# Patient Record
Sex: FEMALE | Race: WHITE | NOT HISPANIC OR LATINO | Employment: UNEMPLOYED | URBAN - METROPOLITAN AREA
[De-identification: names, ages, dates, MRNs, and addresses within clinical notes are randomized per-mention and may not be internally consistent; named-entity substitution may affect disease eponyms.]

---

## 2017-01-03 ENCOUNTER — MYC REFILL (OUTPATIENT)
Dept: PEDIATRICS | Facility: CLINIC | Age: 42
End: 2017-01-03

## 2017-01-03 DIAGNOSIS — F32.A DEPRESSION, UNSPECIFIED DEPRESSION TYPE: ICD-10-CM

## 2017-01-03 RX ORDER — BUPROPION HYDROCHLORIDE 150 MG/1
TABLET, EXTENDED RELEASE ORAL
Qty: 180 TABLET | Refills: 0 | Status: SHIPPED | OUTPATIENT
Start: 2017-01-03 | End: 2017-04-08

## 2017-01-03 NOTE — TELEPHONE ENCOUNTER
Wellbutrin  MG       Last Written Prescription Date: 9-29-16  Last Fill Quantity: 60; # refills: 2  Last Office Visit with [facility], [facility] or [facility] prescribing provider:  9/29/16        Last PHQ-9 score on record=   PHQ-9 SCORE 9/28/2016   Total Score MyChart 13 (Moderate depression)       No results found for this basename: ast  No results found for this basename: alt    Routing refill request to provider for review/approval because:  Patient needs to be seen because:  Patient was to follow up in 2 wks and has not.

## 2017-01-03 NOTE — TELEPHONE ENCOUNTER
Message from Kathleent:  Original authorizing provider: NORI Hines CNP would like a refill of the following medications:  buPROPion (WELLBUTRIN SR) 150 MG 12 hr tablet [NORI Hines CNP]    Preferred pharmacy: [id number] - [place], [place] - [address] [address]    Comment:

## 2017-01-05 ENCOUNTER — OFFICE VISIT (OUTPATIENT)
Dept: PEDIATRICS | Facility: CLINIC | Age: 42
End: 2017-01-05
Payer: COMMERCIAL

## 2017-01-05 VITALS
BODY MASS INDEX: 42.24 KG/M2 | WEIGHT: 253.5 LBS | HEIGHT: 65 IN | DIASTOLIC BLOOD PRESSURE: 74 MMHG | OXYGEN SATURATION: 97 % | SYSTOLIC BLOOD PRESSURE: 120 MMHG | TEMPERATURE: 98.2 F | HEART RATE: 108 BPM

## 2017-01-05 DIAGNOSIS — F32.A DEPRESSION, UNSPECIFIED DEPRESSION TYPE: Primary | ICD-10-CM

## 2017-01-05 PROCEDURE — 99213 OFFICE O/P EST LOW 20 MIN: CPT | Performed by: NURSE PRACTITIONER

## 2017-01-05 RX ORDER — BUPROPION HYDROCHLORIDE 150 MG/1
TABLET, EXTENDED RELEASE ORAL
Qty: 180 TABLET | Refills: 0 | Status: CANCELLED | OUTPATIENT
Start: 2017-01-05

## 2017-01-05 NOTE — PATIENT INSTRUCTIONS
1. Continue Wellbutrin  2. Come up with a twice a week exercise regimen (25 minutes minimum)  3. Continue coloring.   4. Come up with a phrase to help remind you that working out will benefit your family.

## 2017-01-05 NOTE — PROGRESS NOTES
"  SUBJECTIVE:                                                    Elle Lambert is a 41 year old female who presents to clinic today for the following health issues:    Medication Followup of Wellbutrin    Taking Medication as prescribed: yes    Side Effects:  None    Medication Helping Symptoms:  yes     Much improved since starting Wellbutrin. Feels she has more energy with her son and  and is more engaged. Is less edgy. Feels she is bonding better with her son and can see changes in him as well. They are focusing now on eating home cooked meals. Wants to start exercising.    ROS: const/psych otherwise negative     OBJECTIVE:  /74 mmHg  Pulse 108  Temp(Src) 98.2  F (36.8  C) (Tympanic)  Ht 5' 5.25\" (1.657 m)  Wt 253 lb 8 oz (114.987 kg)  BMI 41.88 kg/m2  SpO2 97%  CONSTITUTIONAL: Alert, well-nourished, well-groomed, NAD  RESP: Lungs CTA. No wheeze, rhonchi, rales.  CV: HRRR S1 S2 No MRG. No peripheral edema  PSYCH: Bright affect. Appropriate mentation and speech.       ASSESSMENT/PLAN:  (F32.9) Depression, unspecified depression type  (primary encounter diagnosis)  Comment: Much improved since starting Wellbutrin 3 months ago. Has noticed more patience and energy with her 2 y.o. Son and husbnad.   Plan:   Patient Instructions   1. Continue Wellbutrin  2. Come up with a twice a week exercise regimen (25 minutes minimum)  3. Continue coloring.   4. Come up with a phrase to help remind you that working out will benefit your family as well as yourself  5. F/U 3 months  6. \"The Chemistry of [place]na\" by Adiel Ojeda.       Gaby Bui, FNP-DNP.        "

## 2017-01-05 NOTE — MR AVS SNAPSHOT
After Visit Summary   1/5/2017    Elle Lambert    MRN: [id number]           Patient Information     Date Of Birth          1975        Visit Information        Provider Department      1/5/2017 8:00 AM Gaby Bui APRN CNP [facility]        Today's Diagnoses     Depression, unspecified depression type    -  1       Care Instructions    1. Continue Wellbutrin  2. Come up with a twice a week exercise regimen (25 minutes minimum)  3. Continue coloring.   4. Come up with a phrase to help remind you that working out will benefit your family.         Follow-ups after your visit        Who to contact     If you have questions or need follow up information about today's clinic visit or your schedule please contact [facility] directly at [phone].  Normal or non-critical lab and imaging results will be communicated to you by Haltonhart, letter or phone within 4 business days after the clinic has received the results. If you do not hear from us within 7 days, please contact the clinic through Haltonhart or phone. If you have a critical or abnormal lab result, we will notify you by phone as soon as possible.  Submit refill requests through Quantum4D or call your pharmacy and they will forward the refill request to us. Please allow 3 business days for your refill to be completed.          Additional Information About Your Visit        MyChart Information     Quantum4D gives you secure access to your electronic health record. If you see a primary care provider, you can also send messages to your care team and make appointments. If you have questions, please call your primary care clinic.  If you do not have a primary care provider, please call [phone] and they will assist you.        Care EveryWhere ID     This is your Care EveryWhere ID. This could be used by other organizations to access your [place] medical records  [id number]        Your Vitals Were     Pulse  "Temperature Height BMI (Body Mass Index) Pulse Oximetry       108 98.2  F (36.8  C) (Tympanic) 5' 5.25\" (1.657 m) 41.88 kg/m2 97%        Blood Pressure from Last 3 Encounters:   01/05/17 120/74   09/29/16 116/74   11/27/14 135/60    Weight from Last 3 Encounters:   01/05/17 253 lb 8 oz (114.987 kg)   09/29/16 250 lb (113.399 kg)   11/25/14 232 lb (105.235 kg)              Today, you had the following     No orders found for display       Primary Care Provider    [facility]       No address on file        Thank you!     Thank you for choosing [facility]  for your care. Our goal is always to provide you with excellent care. Hearing back from our patients is one way we can continue to improve our services. Please take a few minutes to complete the written survey that you may receive in the mail after your visit with us. Thank you!             Your Updated Medication List - Protect others around you: Learn how to safely use, store and throw away your medicines at www.disposemymeds.org.          This list is accurate as of: 1/5/17  8:30 AM.  Always use your most recent med list.                   Brand Name Dispense Instructions for use    5-HTP PO          acetaminophen 325 MG tablet    TYLENOL    100 tablet    Take 2 tablets (650 mg) by mouth every 4 hours as needed for mild pain or fever (greater than or equal to 38? C /100.4? F (oral) or 38.5? C/ 101.4? F (core).)       buPROPion 150 MG 12 hr tablet    WELLBUTRIN SR    180 tablet    1 tab twice a day       ferrous sulfate 325 (65 FE) MG tablet    IRON    90 tablet    Take 1 tablet (325 mg) by mouth daily (with breakfast)       loratadine 10 MG ODT tab    CLARITIN REDITABS     Take 10 mg by mouth daily       Multi-vitamin Tabs tablet      Take 1 tablet by mouth daily       OMEGA-3 FISH OIL PO          OMEPRAZOLE PO      Take 20 mg by mouth       vitamin D 2000 UNITS tablet     100 tablet    Take 1 tablet by mouth 3 times daily         "

## 2017-01-05 NOTE — NURSING NOTE
"Chief Complaint   Patient presents with     Recheck Medication     wellbutrin       Initial /74 mmHg  Pulse 108  Temp(Src) 98.2  F (36.8  C) (Tympanic)  Ht 5' 5.25\" (1.657 m)  Wt 253 lb 8 oz (114.987 kg)  BMI 41.88 kg/m2  SpO2 97% Estimated body mass index is 41.88 kg/(m^2) as calculated from the following:    Height as of this encounter: 5' 5.25\" (1.657 m).    Weight as of this encounter: 253 lb 8 oz (114.987 kg).  BP completed using cuff size: janes Mata CMA      "

## 2017-04-08 DIAGNOSIS — F32.A DEPRESSION, UNSPECIFIED DEPRESSION TYPE: ICD-10-CM

## 2017-04-09 NOTE — TELEPHONE ENCOUNTER
buPROPion (WELLBUTRIN SR) 150 MG 12 hr tablet       Last Written Prescription Date: 01-  Last Fill Quantity: 180; # refills: 0  Last Office Visit with [facility], [facility] or [facility] prescribing provider:  01-        Last PHQ-9 score on record=   PHQ-9 SCORE 9/28/2016   Total Score MyChart 13 (Moderate depression)       No results found for: AST  No results found for: ALT

## 2017-04-10 ENCOUNTER — MYC MEDICAL ADVICE (OUTPATIENT)
Dept: PEDIATRICS | Facility: CLINIC | Age: 42
End: 2017-04-10

## 2017-04-10 NOTE — TELEPHONE ENCOUNTER
Routing refill request to provider for review/approval because:  Lab results are not current.  PHQ score is higher than RN protocol.  I have sent patient a questionnaire to complete.    Please see My Chart message-patient has been out of medication for a few days, and would also like a dose change.  Appointment advised.  Order pended for one refill?  CASEY Bartlett RN

## 2017-04-11 ENCOUNTER — MYC MEDICAL ADVICE (OUTPATIENT)
Dept: PEDIATRICS | Facility: CLINIC | Age: 42
End: 2017-04-11

## 2017-04-11 RX ORDER — BUPROPION HYDROCHLORIDE 150 MG/1
TABLET, EXTENDED RELEASE ORAL
Qty: 60 TABLET | Refills: 0 | Status: SHIPPED | OUTPATIENT
Start: 2017-04-11 | End: 2017-05-11

## 2017-04-11 NOTE — TELEPHONE ENCOUNTER
Forwarded to provider for review.  Ok to refill for one month pending an appointment to discuss dose adjustment.  CASEY Bartlett RN

## 2017-04-13 ENCOUNTER — OFFICE VISIT (OUTPATIENT)
Dept: PEDIATRICS | Facility: CLINIC | Age: 42
End: 2017-04-13
Payer: COMMERCIAL

## 2017-04-13 VITALS
OXYGEN SATURATION: 98 % | HEART RATE: 93 BPM | BODY MASS INDEX: 42.05 KG/M2 | HEIGHT: 65 IN | SYSTOLIC BLOOD PRESSURE: 114 MMHG | WEIGHT: 252.4 LBS | DIASTOLIC BLOOD PRESSURE: 74 MMHG | TEMPERATURE: 97.9 F

## 2017-04-13 DIAGNOSIS — F43.23 ADJUSTMENT DISORDER WITH MIXED ANXIETY AND DEPRESSED MOOD: Primary | ICD-10-CM

## 2017-04-13 PROCEDURE — 99214 OFFICE O/P EST MOD 30 MIN: CPT | Performed by: NURSE PRACTITIONER

## 2017-04-13 RX ORDER — ESCITALOPRAM OXALATE 10 MG/1
10 TABLET ORAL DAILY
Qty: 90 TABLET | Refills: 0 | Status: SHIPPED | OUTPATIENT
Start: 2017-04-13 | End: 2017-07-13

## 2017-04-13 NOTE — PROGRESS NOTES
"  SUBJECTIVE:                                                    Elle Lambert is a 42 year old female who presents to clinic today for the following health issues:    Medication Followup of Wellbutrin    Taking Medication as prescribed: yes    Side Effects:  None    Medication Helping Symptoms:  yes     Reports that since beginning the medication, she has experienced an improvement in her symptoms. However, does not feel she is optimally managed. Still experiences feelings of guilt about being an inadequate mother and wife. Rates her depressive symptoms at their best as 5/10 and at their worst a 0/10. Last weekend she ran out of her Wellbutrin and states she \"hit rock bottom.\" Since beginning her medication again, symptoms have improved. Her  offers helpfpul support and she also finds support through several online mother groups. She has not had the energy to begin making lifestyle modifications including diet and exercise but does report getting a fit bit for her birthday, which she has found helpful in increasing her awareness of her daily activity level.    Problem list and histories reviewed & adjusted, as indicated.  Additional history: as documented    Patient Active Problem List   Diagnosis     Galactorrhea not associated with childbirth     Vitamin B 12 deficiency     Bariatric surgery status     Vitamin D deficiency     Morbid obesity (H)     Mild recurrent major depression (H)     Depression, unspecified depression type     Past Surgical History:   Procedure Laterality Date     GASTRIC BYPASS  2004       Social History   Substance Use Topics     Smoking status: Former Smoker     Quit date: 8/11/2013     Smokeless tobacco: Never Used     Alcohol use Yes      Comment: 2 times per month, 2 drinks per time     Family History   Problem Relation Age of Onset     CANCER Father 48     lung-smoker     Hypertension Mother      Other Cancer Maternal Grandfather      brain     Asthma Paternal Grandmother      " "Asthma Paternal Grandfather      Coronary Artery Disease Paternal Grandfather      Heart Failure Paternal Grandfather          Current Outpatient Prescriptions   Medication Sig Dispense Refill     escitalopram (LEXAPRO) 10 MG tablet Take 1 tablet (10 mg) by mouth daily 90 tablet 0     buPROPion (WELLBUTRIN SR) 150 MG 12 hr tablet 1 tab twice a day 60 tablet 0     ferrous sulfate (IRON) 325 (65 FE) MG tablet Take 1 tablet (325 mg) by mouth daily (with breakfast) 90 tablet 0     loratadine (CLARITIN REDITABS) 10 MG dispersible tablet Take 10 mg by mouth daily       OMEPRAZOLE PO Take 20 mg by mouth       Omega-3 Fatty Acids (OMEGA-3 FISH OIL PO)        multivitamin, therapeutic with minerals (MULTI-VITAMIN) TABS Take 1 tablet by mouth daily       Cholecalciferol (VITAMIN D) 2000 UNITS tablet Take 1 tablet by mouth 3 times daily  100 tablet 3     5-Hydroxytryptophan (5-HTP PO) Reported on 4/13/2017       acetaminophen (TYLENOL) 325 MG tablet Take 2 tablets (650 mg) by mouth every 4 hours as needed for mild pain or fever (greater than or equal to 38  C /100.4  F (oral) or 38.5  C/ 101.4  F (core).) (Patient not taking: Reported on 4/13/2017) 100 tablet 0     Allergies   Allergen Reactions     Cefuroxime Axetil      ceftin     Nsaids      Had gastric bypass       Reviewed and updated as needed this visit by clinical staff       Reviewed and updated as needed this visit by Provider         ROS:  Unless otherwise noted in HPI, complete ROS is negative.    OBJECTIVE:                                                    /74 (Cuff Size: Adult Large)  Pulse 93  Temp 97.9  F (36.6  C) (Tympanic)  Ht 5' 5.25\" (1.657 m)  Wt 252 lb 6.4 oz (114.5 kg)  SpO2 98%  BMI 41.68 kg/m2  Body mass index is 41.68 kg/(m^2).  GENERAL: healthy, alert and no distress  PSYCH: mentation appears normal, affect normal/bright. Does appears tearful at times.     Diagnostic Test Results:  none      ASSESSMENT/PLAN:                              " "                        (F43.23) Adjustment disorder with mixed anxiety and depressed mood  (primary encounter diagnosis)  Comment: Initiated Wellbutrin Jan/2017. Reports symptoms improvement, but is not optimally managed. Continues to experience feelings of guilt about being an inadequate mother and wife. Rates depressive symptoms between 0-5/10. Last weekend she ran out of her Wellbutrin and states she \"hit rock bottom.\" Finds support through  and several online mother groups. Has not had the energy to begin making lifestyle modifications like exercise. Denies suicidal or homicidal ideation.     I would like to add an SSRI to the current regimen as I do not believe depressive symptoms are optimally managed. Has tried Zoloft previously without effect, so will try Lexapro. I also recommend CBT at least weekly, patient is receptive to this. I am hopeful this treatment plan will create the space for patient to work on lifestyle modifications, specifically exercise as I feel this will improve her symptoms of depression.  Plan:        - MENTAL HEALTH REFERRAL       - escitalopram (LEXAPRO) 10 MG tablet         Follow-up: return to clinic or call me in 6 weeks or sooner if new symptoms develop, symptoms fail to improve or worsen.     Holly Hsieh RN, FNP-DNP Student  [facility]    NORI Hines [facility]    "

## 2017-04-13 NOTE — MR AVS SNAPSHOT
After Visit Summary   4/13/2017    Elle Lambert    MRN: [id number]           Patient Information     Date Of Birth          1975        Visit Information        Provider Department      4/13/2017 11:40 AM Gaby Bui APRN CNP [facility]        Today's Diagnoses     Adjustment disorder with mixed anxiety and depressed mood    -  1      Care Instructions    1. Set up an appointment with a therapist. I recommend at least once weekly.  2. Mima's light [email or web address]  3. Start Lexapro. Start with 1/2 tab per day for about 3 days then go to a full tab.         Follow-ups after your visit        Additional Services     MENTAL HEALTH REFERRAL       Your provider has referred you to: [facility]: [facility] - Counseling (Individual/Couples/Family) - [facility] [phone]   [email or web address]   *Patient will be contacted by [facility] scheduling partner, [facility]), to schedule an appointment.  Patients may also call [facility] to schedule.    All scheduling is subject to the client's specific insurance plan & benefits, provider/location availability, and provider clinical specialities.  Please arrive 15 minutes early for your first appointment and bring your completed paperwork.    Please be aware that coverage of these services is subject to the terms and limitations of your health insurance plan.  Call member services at your health plan with any benefit or coverage questions.                  Who to contact     If you have questions or need follow up information about today's clinic visit or your schedule please contact [facility] directly at [phone].  Normal or non-critical lab and imaging results will be communicated to you by MyChart, letter or phone within 4  "business days after the clinic has received the results. If you do not hear from us within 7 days, please contact the clinic through Practice Fusion or phone. If you have a critical or abnormal lab result, we will notify you by phone as soon as possible.  Submit refill requests through Practice Fusion or call your pharmacy and they will forward the refill request to us. Please allow 3 business days for your refill to be completed.          Additional Information About Your Visit        Practice Fusion Information     P[employer] Fusion gives you secure access to your electronic health record. If you see a primary care provider, you can also send messages to your care team and make appointments. If you have questions, please call your primary care clinic.  If you do not have a primary care provider, please call [phone] and they will assist you.        Care EveryWhere ID     This is your Care EveryWhere ID. This could be used by other organizations to access your [place] medical records  [id number]        Your Vitals Were     Pulse Temperature Height Pulse Oximetry BMI (Body Mass Index)       93 97.9  F (36.6  C) (Tympanic) 5' 5.25\" (1.657 m) 98% 41.68 kg/m2        Blood Pressure from Last 3 Encounters:   04/13/17 114/74   01/05/17 120/74   09/29/16 116/74    Weight from Last 3 Encounters:   04/13/17 252 lb 6.4 oz (114.5 kg)   01/05/17 253 lb 8 oz (115 kg)   09/29/16 250 lb (113.4 kg)              We Performed the Following     MENTAL HEALTH REFERRAL          Today's Medication Changes          These changes are accurate as of: 4/13/17 12:29 PM.  If you have any questions, ask your nurse or doctor.               Start taking these medicines.        Dose/Directions    escitalopram 10 MG tablet   Commonly known as:  LEXAPRO   Used for:  Adjustment disorder with mixed anxiety and depressed mood   Started by:  Gaby Bui APRN CNP        Dose:  10 mg   Take 1 tablet (10 mg) by mouth daily   Quantity:  90 tablet   Refills:  0          "   Where to get your medicines      These medications were sent to [id number] - [place], [place] - [address] [address]  [address] [address], [place] [address]     Phone:  [phone]     escitalopram 10 MG tablet                Primary Care Provider    [facility] [place] [facility]       No address on file        Thank you!     Thank you for choosing [facility]  for your care. Our goal is always to provide you with excellent care. Hearing back from our patients is one way we can continue to improve our services. Please take a few minutes to complete the written survey that you may receive in the mail after your visit with us. Thank you!             Your Updated Medication List - Protect others around you: Learn how to safely use, store and throw away your medicines at www.disposemymeds.org.          This list is accurate as of: 4/13/17 12:29 PM.  Always use your most recent med list.                   Brand Name Dispense Instructions for use    5-HTP PO      Reported on 4/13/2017       acetaminophen 325 MG tablet    TYLENOL    100 tablet    Take 2 tablets (650 mg) by mouth every 4 hours as needed for mild pain or fever (greater than or equal to 38? C /100.4? F (oral) or 38.5? C/ 101.4? F (core).)       buPROPion 150 MG 12 hr tablet    WELLBUTRIN SR    60 tablet    1 tab twice a day       escitalopram 10 MG tablet    LEXAPRO    90 tablet    Take 1 tablet (10 mg) by mouth daily       ferrous sulfate 325 (65 FE) MG tablet    IRON    90 tablet    Take 1 tablet (325 mg) by mouth daily (with breakfast)       loratadine 10 MG ODT tab    CLARITIN REDITABS     Take 10 mg by mouth daily       Multi-vitamin Tabs tablet      Take 1 tablet by mouth daily       OMEGA-3 FISH OIL PO          OMEPRAZOLE PO      Take 20 mg by mouth       vitamin D 2000 UNITS tablet     100 tablet    Take 1 tablet by mouth 3 times daily

## 2017-04-13 NOTE — PATIENT INSTRUCTIONS
1. Set up an appointment with a therapist. I recommend at least once weekly.  2. Mima's light [email or web address]  3. Start Lexapro. Start with 1/2 tab per day for about 3 days then go to a full tab.

## 2017-04-13 NOTE — NURSING NOTE
"Chief Complaint   Patient presents with     Depression       Initial /74 (Cuff Size: Adult Large)  Pulse 93  Temp 97.9  F (36.6  C) (Tympanic)  Ht 5' 5.25\" (1.657 m)  Wt 252 lb 6.4 oz (114.5 kg)  SpO2 98%  BMI 41.68 kg/m2 Estimated body mass index is 41.68 kg/(m^2) as calculated from the following:    Height as of this encounter: 5' 5.25\" (1.657 m).    Weight as of this encounter: 252 lb 6.4 oz (114.5 kg).  Medication Reconciliation: complete   Kimi Mata CMA    "

## 2017-04-14 ASSESSMENT — PATIENT HEALTH QUESTIONNAIRE - PHQ9: SUM OF ALL RESPONSES TO PHQ QUESTIONS 1-9: 8

## 2017-05-08 ENCOUNTER — TELEPHONE (OUTPATIENT)
Dept: PEDIATRICS | Facility: CLINIC | Age: 42
End: 2017-05-08

## 2017-05-08 DIAGNOSIS — F32.A DEPRESSION, UNSPECIFIED DEPRESSION TYPE: Primary | ICD-10-CM

## 2017-05-08 NOTE — TELEPHONE ENCOUNTER
Panel Management Review      Patient has the following on her problem list:     Depression / Dysthymia review  PHQ-9 SCORE 9/28/2016 4/13/2017   Total Score MyChart 13 (Moderate depression) -   Total Score - 8      Patient is due for:  DAP      Composite cancer screening  Chart review shows that this patient is due/due soon for the following Pap Smear  Summary:    Patient is due/failing the following:   DAP, PAP and PHYSICAL    Action needed:   Patient needs office visit for pap & physical. DAP run off and mailed to patient.    Type of outreach:    Sent [employer] message.    Questions for provider review:    None                                                                                                                                    Kimi Mata CMA    Chart routed to Care Team .

## 2017-05-08 NOTE — LETTER
My Depression Action Plan  Name: Elle Lambert   Date of Birth 1975  Date: 5/8/2017    My doctor: [facility]   [facility]  [address]  [place] [address] [phone]          GREEN    ZONE   Good Control    What it looks like:     Things are going generally well. You have normal up s and down s. You may even feel depressed from time to time, but bad moods usually last less than a day.   What you need to do:  1. Continue to care for yourself (see self care plan)  2. Check your depression survival kit and update it as needed  3. Follow your physician s recommendations including any medication.  4. Do not stop taking medication unless you consult with your physician first.           YELLOW         ZONE Getting Worse    What it looks like:     Depression is starting to interfere with your life.     It may be hard to get out of bed; you may be starting to isolate yourself from others.    Symptoms of depression are starting to last most all day and this has happened for several days.     You may have suicidal thoughts but they are not constant.   What you need to do:     1. Call your care team, your response to treatment will improve if you keep your care team informed of your progress. Yellow periods are signs an adjustment may need to be made.     2. Continue your self-care, even if you have to fake it!    3. Talk to someone in your support network    4. Open up your depression survival kit           RED    ZONE Medical Alert - Get Help    What it looks like:     Depression is seriously interfering with your life.     You may experience these or other symptoms: You can t get out of bed most days, can t work or engage in other necessary activities, you have trouble taking care of basic hygiene, or basic responsibilities, thoughts of suicide or death that will not go away, self-injurious behavior.     What you need to  do:  1. Call your care team and request a same-day appointment. If they are not available (weekends or after hours) call your local crisis line, emergency room or 911.      Electronically signed by: Kimi Mata, May 8, 2017    Depression Self Care Plan / Survival Kit    Self-Care for Depression  Here s the deal. Your body and mind are really not as separate as most people think.  What you do and think affects how you feel and how you feel influences what you do and think. This means if you do things that people who feel good do, it will help you feel better.  Sometimes this is all it takes.  There is also a place for medication and therapy depending on how severe your depression is, so be sure to consult with your medical provider and/ or Behavioral Health Consultant if your symptoms are worsening or not improving.     In order to better manage my stress, I will:    Exercise  Get some form of exercise, every day. This will help reduce pain and release endorphins, the  feel good  chemicals in your brain. This is almost as good as taking antidepressants!  This is not the same as joining a gym and then never going! (they count on that by the way ) It can be as simple as just going for a walk or doing some gardening, anything that will get you moving.      Hygiene   Maintain good hygiene (Get out of bed in the morning, Make your bed, Brush your teeth, Take a shower, and Get dressed like you were going to work, even if you are unemployed).  If your clothes don't fit try to get ones that do.    Diet  I will strive to eat foods that are good for me, drink plenty of water, and avoid excessive sugar, caffeine, alcohol, and other mood-altering substances.  Some foods that are helpful in depression are: complex carbohydrates, B vitamins, flaxseed, fish or fish oil, fresh fruits and vegetables.    Psychotherapy  I agree to participate in Individual Therapy (if recommended).    Medication  If prescribed medications, I agree to  take them.  Missing doses can result in serious side effects.  I understand that drinking alcohol, or other illicit drug use, may cause potential side effects.  I will not stop my medication abruptly without first discussing it with my provider.    Staying Connected With Others  I will stay in touch with my friends, family members, and my primary care provider/team.    Use your imagination  Be creative.  We all have a creative side; it doesn t matter if it s oil painting, sand castles, or mud pies! This will also kick up the endorphins.    Witness Beauty  (AKA stop and smell the roses) Take a look outside, even in mid-winter. Notice colors, textures. Watch the squirrels and birds.     Service to others  Be of service to others.  There is always someone else in need.  By helping others we can  get out of ourselves  and remember the really important things.  This also provides opportunities for practicing all the other parts of the program.    Humor  Laugh and be silly!  Adjust your TV habits for less news and crime-drama and more comedy.    Control your stress  Try breathing deep, massage therapy, biofeedback, and meditation. Find time to relax each day.     My support system    Clinic Contact:  Phone number:    Contact 1:  Phone number:    Contact 2:  Phone number:    Sabianism/:  Phone number:    Therapist:  Phone number:    Local crisis center:    Phone number:    Other community support:  Phone number:

## 2017-05-09 DIAGNOSIS — F32.A DEPRESSION, UNSPECIFIED DEPRESSION TYPE: ICD-10-CM

## 2017-05-09 RX ORDER — BUPROPION HYDROCHLORIDE 150 MG/1
TABLET, EXTENDED RELEASE ORAL
Qty: 60 TABLET | Refills: 0 | Status: CANCELLED | OUTPATIENT
Start: 2017-05-09

## 2017-05-09 NOTE — TELEPHONE ENCOUNTER
buPROPion (WELLBUTRIN SR) 150 MG 12 hr tablet       Last Written Prescription Date: 4/11/2017  Last Fill Quantity: 60; # refills: 0  Last Office Visit with [facility], [facility] or [facility] prescribing provider:  4/13/2017        Last PHQ-9 score on record=   PHQ-9 SCORE 4/13/2017   Total Score MyChart -   Total Score 8       No results found for: AST  No results found for: ALT

## 2017-05-11 ENCOUNTER — MYC REFILL (OUTPATIENT)
Dept: PEDIATRICS | Facility: CLINIC | Age: 42
End: 2017-05-11

## 2017-05-11 DIAGNOSIS — F32.A DEPRESSION, UNSPECIFIED DEPRESSION TYPE: ICD-10-CM

## 2017-05-11 RX ORDER — BUPROPION HYDROCHLORIDE 150 MG/1
TABLET, EXTENDED RELEASE ORAL
Qty: 180 TABLET | Refills: 0 | Status: SHIPPED | OUTPATIENT
Start: 2017-05-11 | End: 2017-08-09

## 2017-05-11 NOTE — TELEPHONE ENCOUNTER
Wellbutrin  mg       Last Written Prescription Date: 04/11/17  Last Fill Quantity: 60; # refills: 0  Last Office Visit with [facility], [facility] or [facility] prescribing provider:  04/13/17        Last PHQ-9 score on record=   PHQ-9 SCORE 4/13/2017   Total Score MyChart -   Total Score 8       No results found for: AST  No results found for: ALT  Routing refill request to provider for review/approval because:  Labs not current:  AST/ALT  PHQ-9 score is >4-per protocol unable to fill.  CASEY Bartlett RN

## 2017-05-11 NOTE — TELEPHONE ENCOUNTER
Message from [employer]:  Original authorizing provider: NORI Hines CNP would like a refill of the following medications:  buPROPion (WELLBUTRIN SR) 150 MG 12 hr tablet [NORI Hines CNP]    Preferred pharmacy: [id number] - [place], [place] - [address] [address]    Comment:  I got a single refill on 4/11 to carry me through until I saw Gaby again on 4/13. I called my pharmacy 2 days ago, but they have not updated to show that I have a refill to  - I took my last pill this morning. Thanks! Elle

## 2017-05-15 NOTE — TELEPHONE ENCOUNTER
Kadeem notes state [facility] Result normal.  DAP run off and mailed to patient - last seen for depression 4/13/17.  Sent to abstraction.  Chart closed  Kimi Mata CMA

## 2017-05-25 NOTE — TELEPHONE ENCOUNTER
Spoke with patient and she will call us back tomorrow or next Tuesday to schedule an appointment with Gaby uBi.

## 2017-06-16 ENCOUNTER — OFFICE VISIT (OUTPATIENT)
Dept: PEDIATRICS | Facility: CLINIC | Age: 42
End: 2017-06-16
Payer: COMMERCIAL

## 2017-06-16 VITALS
SYSTOLIC BLOOD PRESSURE: 106 MMHG | BODY MASS INDEX: 42.38 KG/M2 | TEMPERATURE: 97.2 F | HEART RATE: 110 BPM | HEIGHT: 65 IN | WEIGHT: 254.4 LBS | OXYGEN SATURATION: 97 % | DIASTOLIC BLOOD PRESSURE: 58 MMHG

## 2017-06-16 DIAGNOSIS — F33.0 MILD RECURRENT MAJOR DEPRESSION (H): ICD-10-CM

## 2017-06-16 DIAGNOSIS — Z12.4 SCREENING FOR MALIGNANT NEOPLASM OF CERVIX: Primary | ICD-10-CM

## 2017-06-16 PROCEDURE — G0145 SCR C/V CYTO,THINLAYER,RESCR: HCPCS | Performed by: NURSE PRACTITIONER

## 2017-06-16 PROCEDURE — G0124 SCREEN C/V THIN LAYER BY MD: HCPCS | Performed by: NURSE PRACTITIONER

## 2017-06-16 PROCEDURE — 99213 OFFICE O/P EST LOW 20 MIN: CPT | Performed by: NURSE PRACTITIONER

## 2017-06-16 PROCEDURE — 87624 HPV HI-RISK TYP POOLED RSLT: CPT | Performed by: NURSE PRACTITIONER

## 2017-06-16 ASSESSMENT — ANXIETY QUESTIONNAIRES
6. BECOMING EASILY ANNOYED OR IRRITABLE: NOT AT ALL
IF YOU CHECKED OFF ANY PROBLEMS ON THIS QUESTIONNAIRE, HOW DIFFICULT HAVE THESE PROBLEMS MADE IT FOR YOU TO DO YOUR WORK, TAKE CARE OF THINGS AT HOME, OR GET ALONG WITH OTHER PEOPLE: NOT DIFFICULT AT ALL
1. FEELING NERVOUS, ANXIOUS, OR ON EDGE: NOT AT ALL
7. FEELING AFRAID AS IF SOMETHING AWFUL MIGHT HAPPEN: SEVERAL DAYS
3. WORRYING TOO MUCH ABOUT DIFFERENT THINGS: NOT AT ALL
GAD7 TOTAL SCORE: 1
5. BEING SO RESTLESS THAT IT IS HARD TO SIT STILL: NOT AT ALL
2. NOT BEING ABLE TO STOP OR CONTROL WORRYING: NOT AT ALL

## 2017-06-16 ASSESSMENT — PATIENT HEALTH QUESTIONNAIRE - PHQ9: 5. POOR APPETITE OR OVEREATING: NOT AT ALL

## 2017-06-16 NOTE — MR AVS SNAPSHOT
"              After Visit Summary   6/16/2017    Elle Lambert    MRN: [id number]           Patient Information     Date Of Birth          1975        Visit Information        Provider Department      6/16/2017 3:20 PM Gaby Bui APRN CNP [facility] [place]        Today's Diagnoses     Screening for malignant neoplasm of cervix    -  1    Mild recurrent major depression (H)           Follow-ups after your visit        Who to contact     If you have questions or need follow up information about today's clinic visit or your schedule please contact [facility] directly at [phone].  Normal or non-critical lab and imaging results will be communicated to you by Chuguobanghart, letter or phone within 4 business days after the clinic has received the results. If you do not hear from us within 7 days, please contact the clinic through Chuguobanghart or phone. If you have a critical or abnormal lab result, we will notify you by phone as soon as possible.  Submit refill requests through [employer] or call your pharmacy and they will forward the refill request to us. Please allow 3 business days for your refill to be completed.          Additional Information About Your Visit        MyChart Information     [employer] gives you secure access to your electronic health record. If you see a primary care provider, you can also send messages to your care team and make appointments. If you have questions, please call your primary care clinic.  If you do not have a primary care provider, please call [phone] and they will assist you.        Care EveryWhere ID     This is your Care EveryWhere ID. This could be used by other organizations to access your [facility] medical records  [id number]        Your Vitals Were     Pulse Temperature Height Pulse Oximetry BMI (Body Mass Index)       110 97.2  F (36.2  C) (Tympanic) 5' 5.25\" (1.657 m) 97% 42.01 kg/m2        Blood Pressure from Last 3 Encounters:   06/16/17 106/58 "   04/13/17 114/74   01/05/17 120/74    Weight from Last 3 Encounters:   06/16/17 254 lb 6.4 oz (115.4 kg)   04/13/17 252 lb 6.4 oz (114.5 kg)   01/05/17 253 lb 8 oz (115 kg)              We Performed the Following     HPV High Risk Types DNA Cervical     Pap imaged thin layer screen with HPV - recommended age 30 - 65 years (select HPV order below)        Primary Care Provider    [facility]       No address on file        Thank you!     Thank you for choosing [facility] [place]  for your care. Our goal is always to provide you with excellent care. Hearing back from our patients is one way we can continue to improve our services. Please take a few minutes to complete the written survey that you may receive in the mail after your visit with us. Thank you!             Your Updated Medication List - Protect others around you: Learn how to safely use, store and throw away your medicines at www.disposemymeds.org.          This list is accurate as of: 6/16/17  4:15 PM.  Always use your most recent med list.                   Brand Name Dispense Instructions for use    acetaminophen 325 MG tablet    TYLENOL    100 tablet    Take 2 tablets (650 mg) by mouth every 4 hours as needed for mild pain or fever (greater than or equal to 38? C /100.4? F (oral) or 38.5? C/ 101.4? F (core).)       buPROPion 150 MG 12 hr tablet    WELLBUTRIN SR    180 tablet    1 tab twice a day       escitalopram 10 MG tablet    LEXAPRO    90 tablet    Take 1 tablet (10 mg) by mouth daily       ferrous sulfate 325 (65 FE) MG tablet    IRON    90 tablet    Take 1 tablet (325 mg) by mouth daily (with breakfast)       loratadine 10 MG ODT tab    CLARITIN REDITABS     Take 10 mg by mouth daily       Multi-vitamin Tabs tablet      Take 1 tablet by mouth daily       OMEGA-3 FISH OIL PO          OMEPRAZOLE PO      Take 20 mg by mouth       vitamin D 2000 UNITS tablet     100 tablet    Take 1 tablet by mouth 3 times daily

## 2017-06-16 NOTE — NURSING NOTE
"Chief Complaint   Patient presents with     Gyn Exam     pap only     Recheck Medication     lexapro       Initial /58 (Cuff Size: Adult Large)  Pulse 110  Temp 97.2  F (36.2  C) (Tympanic)  Ht 5' 5.25\" (1.657 m)  Wt 254 lb 6.4 oz (115.4 kg)  SpO2 97%  BMI 42.01 kg/m2 Estimated body mass index is 42.01 kg/(m^2) as calculated from the following:    Height as of this encounter: 5' 5.25\" (1.657 m).    Weight as of this encounter: 254 lb 6.4 oz (115.4 kg).  Medication Reconciliation: complete   Kimi Mata CMA    "

## 2017-06-16 NOTE — PROGRESS NOTES
"  SUBJECTIVE:                                                    Elle Lambert is a 42 year old female who presents to clinic today for the following health issues:    Patient here today requesting pap.  AND  Medication Followup of Lexapro    Taking Medication as prescribed: yes    Side Effects:  None    Medication Helping Symptoms:  yes   Interval history:  Elle reports feeling much \"better\" since adding Lexapro and beginning therapy in April. She has been attending therapy sessions once every two weeks since April, and her therapist has told her that she would be fine to change to once every three weeks to month due to her progress. She states that her mood enhancement has allowed her to feel like an adequate mother and wife once again. She denies side effects associated with this medication (e.g. Low libido, GI upset, headache).    Problem list and histories reviewed & adjusted, as indicated.  Additional history: as documented    Reviewed and updated as needed this visit by clinical staff  Tobacco  Allergies  Meds  Med Hx  Surg Hx  Fam Hx  Soc Hx      Reviewed and updated as needed this visit by Provider       ROS:  C: NEGATIVE for fever, chills, change in weight  PSYCHIATRIC: Positive change in mood noted since medication and therapy addition in April   ROS otherwise negative    OBJECTIVE:                                                    /58 (Cuff Size: Adult Large)  Pulse 110  Temp 97.2  F (36.2  C) (Tympanic)  Ht 5' 5.25\" (1.657 m)  Wt 254 lb 6.4 oz (115.4 kg)  SpO2 97%  BMI 42.01 kg/m2  Body mass index is 42.01 kg/(m^2).   GENERAL: healthy, alert, well nourished, well hydrated, no distress  RESP: lungs clear to auscultation - no rales, no rhonchi, no wheezes  BREAST: no masses, no tenderness, no nipple discharge, no palpable axillary masses or adenopathy  CV: regular rates and rhythm, normal S1 S2, no S3 or S4 and no murmur, no click or rub -  ABDOMEN: soft, no tenderness, no  " hepatosplenomegaly, no masses, normal bowel sounds  SKIN: no suspicious lesions, no rashes  - female: cervix- normal, adnexae- normal; uterus- normal, no masses, no discharge  PSYCH: Alert and oriented times 3; speech- coherent , normal rate and volume; able to articulate logical thoughts, able to abstract reason, no tangential thoughts, no hallucinations or delusions, affect- normal    Diagnostic test results:       ASSESSMENT/PLAN:                                                    (Z12.4) Screening for malignant neoplasm of cervix  (primary encounter diagnosis)  Plan: Pap imaged thin layer screen with HPV -         recommended age 30 - 65 years (select HPV order        below), HPV High Risk Types DNA Cervical            (F33.0) Mild recurrent major depression (H)  Comment: Doing much better with the addition of Lexapro to her Wellbutrin. Therapy has helped immensely. States her  is also starting to help.   Plan: Continue therapy and medications.  Discussed starting exercise and treating it as a self care activity.  Discussed possible weaning of medication in 6 months.   Ok to refill meds between now and mid December.       Follow up with Provider - 6 months.      NORI Hines [facility] [place]

## 2017-06-17 ASSESSMENT — PATIENT HEALTH QUESTIONNAIRE - PHQ9: SUM OF ALL RESPONSES TO PHQ QUESTIONS 1-9: 2

## 2017-06-17 ASSESSMENT — ANXIETY QUESTIONNAIRES: GAD7 TOTAL SCORE: 1

## 2017-06-21 LAB
COPATH REPORT: ABNORMAL
PAP: ABNORMAL

## 2017-06-23 PROBLEM — R87.610 ASCUS OF CERVIX WITH NEGATIVE HIGH RISK HPV: Status: ACTIVE | Noted: 2017-06-17

## 2017-06-23 LAB
FINAL DIAGNOSIS: NORMAL
HPV HR 12 DNA CVX QL NAA+PROBE: NEGATIVE
HPV16 DNA SPEC QL NAA+PROBE: NEGATIVE
HPV18 DNA SPEC QL NAA+PROBE: NEGATIVE
SPECIMEN DESCRIPTION: NORMAL

## 2017-07-12 DIAGNOSIS — F43.23 ADJUSTMENT DISORDER WITH MIXED ANXIETY AND DEPRESSED MOOD: ICD-10-CM

## 2017-07-12 RX ORDER — ESCITALOPRAM OXALATE 10 MG/1
10 TABLET ORAL DAILY
Qty: 90 TABLET | Refills: 0 | Status: CANCELLED | OUTPATIENT
Start: 2017-07-12

## 2017-07-12 NOTE — TELEPHONE ENCOUNTER
escitalopram (LEXAPRO) 10 MG tablet     Last Written Prescription Date: 4/13/2017  Last Fill Quantity: 90, # refills: 0  Last Office Visit with [facility] primary care provider:  6/16/2017        Last PHQ-9 score on record=   PHQ-9 SCORE 6/16/2017   Total Score MyChart -   Total Score 2

## 2017-07-13 ENCOUNTER — MYC REFILL (OUTPATIENT)
Dept: PEDIATRICS | Facility: CLINIC | Age: 42
End: 2017-07-13

## 2017-07-13 DIAGNOSIS — F43.23 ADJUSTMENT DISORDER WITH MIXED ANXIETY AND DEPRESSED MOOD: ICD-10-CM

## 2017-07-13 RX ORDER — ESCITALOPRAM OXALATE 10 MG/1
10 TABLET ORAL DAILY
Qty: 90 TABLET | Refills: 0 | Status: SHIPPED | OUTPATIENT
Start: 2017-07-13 | End: 2017-10-05

## 2017-07-13 NOTE — TELEPHONE ENCOUNTER
Lexapro 10 mg     Last Written Prescription Date: 04/13/17  Last Fill Quantity: 90, # refills: 0  [place] [facility] Visit with [facility] [place] care provider:  06/16/17    Per office appointment note-Mild recurrent major depression (H)  Comment: Doing much better with the addition of Lexapro to her Wellbutrin. Therapy has helped immensely. States her  is also starting to help.   Plan: Continue therapy and medications.  Discussed starting exercise and treating it as a self care activity.  Discussed possible weaning of medication in 6 months.   Ok to refill meds between now and mid December.     Last PHQ-9 score on record=   PHQ-9 SCORE 6/16/2017   Total Score MyChart -   Total Score 2     Prescription approved per [facility] Refill Protocol for 3 months.   In mid October can discuss with patient how she is doing and refilling one month with one refill-per provider note may begin weaning in December and ok to refill until mid December.  CASEY Bartlett RN

## 2017-07-13 NOTE — TELEPHONE ENCOUNTER
Message from [employer]:  Original authorizing provider: NORI Hines CNP [facility] would like a refill of the following medications:  escitalopram (LEXAPRO) 10 MG tablet [NORI Hines CNP]    Preferred pharmacy: [id number] - [place], [place] - [address] [address]    Comment:  I contacted the pharmacy yesterday and their records show that the refill has not yet been approved, so I thought I'd go this route - which is what I should've done in the first place. I took my last Lexapro this morning so if I can get the refill approved today, that would be awesome! Thanks!

## 2017-08-09 ENCOUNTER — MYC REFILL (OUTPATIENT)
Dept: PEDIATRICS | Facility: CLINIC | Age: 42
End: 2017-08-09

## 2017-08-09 DIAGNOSIS — F32.A DEPRESSION, UNSPECIFIED DEPRESSION TYPE: ICD-10-CM

## 2017-08-09 RX ORDER — BUPROPION HYDROCHLORIDE 150 MG/1
TABLET, EXTENDED RELEASE ORAL
Qty: 180 TABLET | Refills: 0 | Status: SHIPPED | OUTPATIENT
Start: 2017-08-09 | End: 2017-11-20

## 2017-08-09 NOTE — TELEPHONE ENCOUNTER
Message from [employer]:  Original authorizing provider: NORI Hines CNP would like a refill of the following medications:  buPROPion (WELLBUTRIN SR) 150 MG 12 hr tablet [NORI Hines CNP]    Preferred pharmacy: [id number] - [place], [place] - [address] [address]    Comment:  I currently have enough to dose through Saturday. Thanks! Elle

## 2017-10-05 ENCOUNTER — MYC REFILL (OUTPATIENT)
Dept: PEDIATRICS | Facility: CLINIC | Age: 42
End: 2017-10-05

## 2017-10-05 DIAGNOSIS — F43.23 ADJUSTMENT DISORDER WITH MIXED ANXIETY AND DEPRESSED MOOD: ICD-10-CM

## 2017-10-05 RX ORDER — ESCITALOPRAM OXALATE 10 MG/1
10 TABLET ORAL DAILY
Qty: 90 TABLET | Refills: 0 | Status: SHIPPED | OUTPATIENT
Start: 2017-10-05 | End: 2017-12-19

## 2017-10-05 NOTE — TELEPHONE ENCOUNTER
Message from [employer]:  Original authorizing provider: NORI Hines CNP would like a refill of the following medications:  escitalopram (LEXAPRO) 10 MG tablet [NORI Hines CNP]    Preferred pharmacy: [id number] - [facility] [place] [address] [address]    Comment:  Good morning! I have 6 days left of Lexapro and no refills. Please, can you check with Gaby and call in a refill?

## 2017-10-05 NOTE — TELEPHONE ENCOUNTER
Lexapro 10 mg     Last Written Prescription Date: 7/13/17  Last Fill Quantity: 90, # refills: 0  [facility] Visit with [facility] [place] care provider:  6/16/17        Last PHQ-9 score on record=   PHQ-9 SCORE 6/16/2017   Total Score MyChart -   Total Score 2       Prescription approved per [facility] Refill Protocol.    Bita Bailey RN

## 2017-10-16 DIAGNOSIS — Z12.31 VISIT FOR SCREENING MAMMOGRAM: ICD-10-CM

## 2017-10-16 PROCEDURE — G0202 SCR MAMMO BI INCL CAD: HCPCS | Mod: TC

## 2017-11-20 ENCOUNTER — MYC REFILL (OUTPATIENT)
Dept: PEDIATRICS | Facility: CLINIC | Age: 42
End: 2017-11-20

## 2017-11-20 DIAGNOSIS — F32.A DEPRESSION, UNSPECIFIED DEPRESSION TYPE: ICD-10-CM

## 2017-11-20 RX ORDER — BUPROPION HYDROCHLORIDE 150 MG/1
TABLET, EXTENDED RELEASE ORAL
Qty: 120 TABLET | Refills: 0 | Status: SHIPPED | OUTPATIENT
Start: 2017-11-20 | End: 2017-12-19

## 2017-11-20 NOTE — TELEPHONE ENCOUNTER
Message from [employer]:  Original authorizing provider: NORI Hines CNP would like a refill of the following medications:  buPROPion (WELLBUTRIN SR) 150 MG 12 hr tablet [NORI Hines CNP]    Preferred pharmacy: [id number] - [facility] [place] [address] [address]    Comment:  I just realized that I have 2 days left of Wellbutrin...please check with Gaby for a refill. I will be making an appointment with her for my December follow-up. Thanks! Elle

## 2017-11-20 NOTE — TELEPHONE ENCOUNTER
Bupropion        Last Written Prescription Date: 8/9/2017  Last Fill Quantity: 180; # refills: 0  Last Office Visit with [facility], [facility] or [facility] prescribing provider:  6/16/2017        Last PHQ-9 score on record=   PHQ-9 SCORE 6/16/2017   Total Score MyChart -   Total Score 2       No results found for: AST  No results found for: ALT    Prescription approved per [facility] Refill Protocol.    Patient due for follow up in Mid December per LOV notes.    Anyi ANGEL RN, BSN, PHN  [place] Flex RN

## 2017-12-16 ENCOUNTER — MYC MEDICAL ADVICE (OUTPATIENT)
Dept: PEDIATRICS | Facility: CLINIC | Age: 42
End: 2017-12-16

## 2017-12-19 ENCOUNTER — OFFICE VISIT (OUTPATIENT)
Dept: PEDIATRICS | Facility: CLINIC | Age: 42
End: 2017-12-19
Payer: COMMERCIAL

## 2017-12-19 VITALS
SYSTOLIC BLOOD PRESSURE: 122 MMHG | HEIGHT: 65 IN | BODY MASS INDEX: 42.25 KG/M2 | WEIGHT: 253.6 LBS | HEART RATE: 85 BPM | DIASTOLIC BLOOD PRESSURE: 84 MMHG | TEMPERATURE: 97.8 F | OXYGEN SATURATION: 97 %

## 2017-12-19 DIAGNOSIS — F33.0 MILD RECURRENT MAJOR DEPRESSION (H): Primary | ICD-10-CM

## 2017-12-19 DIAGNOSIS — F43.23 ADJUSTMENT DISORDER WITH MIXED ANXIETY AND DEPRESSED MOOD: ICD-10-CM

## 2017-12-19 DIAGNOSIS — Z23 NEED FOR PROPHYLACTIC VACCINATION AND INOCULATION AGAINST INFLUENZA: ICD-10-CM

## 2017-12-19 DIAGNOSIS — F32.A DEPRESSION, UNSPECIFIED DEPRESSION TYPE: ICD-10-CM

## 2017-12-19 PROCEDURE — 99213 OFFICE O/P EST LOW 20 MIN: CPT | Mod: 25 | Performed by: NURSE PRACTITIONER

## 2017-12-19 PROCEDURE — 90471 IMMUNIZATION ADMIN: CPT | Performed by: NURSE PRACTITIONER

## 2017-12-19 PROCEDURE — 90686 IIV4 VACC NO PRSV 0.5 ML IM: CPT | Performed by: NURSE PRACTITIONER

## 2017-12-19 RX ORDER — ESCITALOPRAM OXALATE 10 MG/1
10 TABLET ORAL DAILY
Qty: 90 TABLET | Refills: 3 | Status: SHIPPED | OUTPATIENT
Start: 2017-12-19 | End: 2018-12-27

## 2017-12-19 RX ORDER — BUPROPION HYDROCHLORIDE 150 MG/1
TABLET, EXTENDED RELEASE ORAL
Qty: 180 TABLET | Refills: 3 | Status: SHIPPED | OUTPATIENT
Start: 2017-12-19 | End: 2019-01-29

## 2017-12-19 ASSESSMENT — ANXIETY QUESTIONNAIRES
3. WORRYING TOO MUCH ABOUT DIFFERENT THINGS: MORE THAN HALF THE DAYS
5. BEING SO RESTLESS THAT IT IS HARD TO SIT STILL: NOT AT ALL
2. NOT BEING ABLE TO STOP OR CONTROL WORRYING: MORE THAN HALF THE DAYS
IF YOU CHECKED OFF ANY PROBLEMS ON THIS QUESTIONNAIRE, HOW DIFFICULT HAVE THESE PROBLEMS MADE IT FOR YOU TO DO YOUR WORK, TAKE CARE OF THINGS AT HOME, OR GET ALONG WITH OTHER PEOPLE: SOMEWHAT DIFFICULT
7. FEELING AFRAID AS IF SOMETHING AWFUL MIGHT HAPPEN: MORE THAN HALF THE DAYS
GAD7 TOTAL SCORE: 10
1. FEELING NERVOUS, ANXIOUS, OR ON EDGE: SEVERAL DAYS
6. BECOMING EASILY ANNOYED OR IRRITABLE: NEARLY EVERY DAY

## 2017-12-19 ASSESSMENT — PATIENT HEALTH QUESTIONNAIRE - PHQ9
5. POOR APPETITE OR OVEREATING: NOT AT ALL
SUM OF ALL RESPONSES TO PHQ QUESTIONS 1-9: 4

## 2017-12-19 NOTE — NURSING NOTE
"Chief Complaint   Patient presents with     Depression     follow up     Imm/Inj     flu shot       Initial /84 (Cuff Size: Adult Large)  Pulse 85  Temp 97.8  F (36.6  C) (Tympanic)  Ht 5' 5.25\" (1.657 m)  Wt 253 lb 9.6 oz (115 kg)  SpO2 97%  BMI 41.88 kg/m2 Estimated body mass index is 41.88 kg/(m^2) as calculated from the following:    Height as of this encounter: 5' 5.25\" (1.657 m).    Weight as of this encounter: 253 lb 9.6 oz (115 kg).  Medication Reconciliation: complete   Kimi Mata CMA    "

## 2017-12-19 NOTE — MR AVS SNAPSHOT
After Visit Summary   12/19/2017    Elle Lambert    MRN: [id number]           Patient Information     Date Of Birth          1975        Visit Information        Provider Department      12/19/2017 3:00 PM Gaby Bui APRN CNP [facility]        Today's Diagnoses     Mild recurrent major depression (H)    -  1    Need for prophylactic vaccination and inoculation against influenza        Depression, unspecified depression type        Adjustment disorder with mixed anxiety and depressed mood          Care Instructions    -At least 1,000mg fish oil per day  -Start therapy  -Physical activity, breath hard and sweat once per week. Do it for yourself-so  You feel good          Follow-ups after your visit        Who to contact     If you have questions or need follow up information about today's clinic visit or your schedule please contact [facility] directly at [phone].  Normal or non-critical lab and imaging results will be communicated to you by Chtiogenhart, letter or phone within 4 business days after the clinic has received the results. If you do not hear from us within 7 days, please contact the clinic through Chtiogenhart or phone. If you have a critical or abnormal lab result, we will notify you by phone as soon as possible.  Submit refill requests through [employer] or call your pharmacy and they will forward the refill request to us. Please allow 3 business days for your refill to be completed.          Additional Information About Your Visit        MyChart Information     [employer] gives you secure access to your electronic health record. If you see a primary care provider, you can also send messages to your care team and make appointments. If you have questions, please call your primary care clinic.  If you do not have a primary care provider, please call [phone] and they will assist you.        Care EveryWhere ID     This is your Care EveryWhere ID. This  "could be used by other organizations to access your [place] medical records  [id number]        Your Vitals Were     Pulse Temperature Height Pulse Oximetry BMI (Body Mass Index)       85 97.8  F (36.6  C) (Tympanic) 5' 5.25\" (1.657 m) 97% 41.88 kg/m2        Blood Pressure from Last 3 Encounters:   12/19/17 122/84   06/16/17 106/58   04/13/17 114/74    Weight from Last 3 Encounters:   12/19/17 253 lb 9.6 oz (115 kg)   06/16/17 254 lb 6.4 oz (115.4 kg)   04/13/17 252 lb 6.4 oz (114.5 kg)              We Performed the Following     FLU VAC, SPLIT VIRUS IM > 3 YO (QUADRIVALENT) [13541]     Vaccine Administration, Initial [69946]          Where to get your medicines      These medications were sent to [id number] - [place], [place] - [address]  [address] [address] [address]     Phone:  [phone]     buPROPion 150 MG 12 hr tablet    escitalopram 10 MG tablet          Primary Care Provider Office Phone # Fax #    Gaby Bui, NORI [facility] [phone] [address] [place] [address]        Equal Access to Services     [facility] AH: Hadii ryanne lomas hadasho Soomaali, waaxda luqadaha, qaybta kaalmada adeegyada, waxlawrence carbone hayfeliciano medrano . So [facility] [phone].    ATENCIÓN: Si habla español, tiene a arroyo disposición servicios gratuitos de asistencia lingüística. Llame al [phone].    We comply with applicable federal civil rights laws and [place] laws. We do not discriminate on the basis of race, color, national origin, age, disability, sex, sexual orientation, or gender identity.            Thank you!     Thank you for choosing [facility]  for your care. Our goal is always to provide you with excellent care. Hearing back from our patients is one way we can continue to improve our services. Please take a few minutes to complete the written survey that you may receive in the mail after your visit with us. Thank you!             Your " Updated Medication List - Protect others around you: Learn how to safely use, store and throw away your medicines at www.disposemymeds.org.          This list is accurate as of: 12/19/17  3:41 PM.  Always use your most recent med list.                   Brand Name Dispense Instructions for use Diagnosis    acetaminophen 325 MG tablet    TYLENOL    100 tablet    Take 2 tablets (650 mg) by mouth every 4 hours as needed for mild pain or fever (greater than or equal to 38? C /100.4? F (oral) or 38.5? C/ 101.4? F (core).)    Vaginal delivery       buPROPion 150 MG 12 hr tablet    WELLBUTRIN SR    180 tablet    1 tab twice a day    Depression, unspecified depression type       escitalopram 10 MG tablet    LEXAPRO    90 tablet    Take 1 tablet (10 mg) by mouth daily    Adjustment disorder with mixed anxiety and depressed mood       ferrous sulfate 325 (65 FE) MG tablet    IRON    90 tablet    Take 1 tablet (325 mg) by mouth daily (with breakfast)    Iron deficiency anemia, unspecified iron deficiency anemia type       loratadine 10 MG ODT tab    CLARITIN REDITABS     Take 10 mg by mouth daily        Multi-vitamin Tabs tablet      Take 1 tablet by mouth daily        OMEGA-3 FISH OIL PO           OMEPRAZOLE PO      Take 20 mg by mouth        vitamin D 2000 UNITS tablet     100 tablet    Take 1 tablet by mouth 3 times daily

## 2017-12-19 NOTE — PATIENT INSTRUCTIONS
-At least 1,000mg fish oil per day  -Start therapy  -Physical activity, breath hard and sweat once per week. Do it for yourself-so  You feel good

## 2017-12-19 NOTE — PROGRESS NOTES
"  SUBJECTIVE:   Elle Lambert is a 42 year old female who presents to clinic today for the following health issues:    Patient requests flu vaccine today.    Depression Followup    Status since last visit: Worsened  - having anxiety about reducing dosages    See PHQ-9 for current symptoms.  Other associated symptoms: None    Complicating factors:   Significant life event:  No   Current substance abuse:  None  Anxiety or Panic symptoms:  Yes-  anxiety    PHQ-9 Score and MyChart F/U Questions 4/13/2017 6/16/2017 12/19/2017   Total Score 8 2 4   Q9: Suicide Ideation Not at all Not at all Not at all     In the past two weeks have you had thoughts of suicide or self-harm?  No.    Do you have concerns about your personal safety or the safety of others?   No    PHQ-9  English  PHQ-9   Any Language  Suicide Assessment Five-step Evaluation and Treatment (SAFE-T)      Amount of exercise or physical activity: 2-3 days/week for an average of 30-45 minutes    Problems taking medications regularly: No    Medication side effects: none    Diet: carbohydrate counting    No SI or HI    ROS: const/psych otherwise negative     OBJECTIVE:  /84 (Cuff Size: Adult Large)  Pulse 85  Temp 97.8  F (36.6  C) (Tympanic)  Ht 5' 5.25\" (1.657 m)  Wt 253 lb 9.6 oz (115 kg)  SpO2 97%  BMI 41.88 kg/m2  CONSTITUTIONAL: Alert, well-nourished, well-groomed, NAD  RESP: Lungs CTA. No wheeze, rhonchi, rales.  CV: HRRR S1 S2 No MRG. No peripheral edema  PSYCH: Bright affect. Appropriate mentation and speech.       ASSESSMENT/PLAN:  (F33.0) Mild recurrent major depression (H)  (primary encounter diagnosis)  Comment: Improved but still struggling occasionally. Has less bad days than she used ot.   Plan: Continue current meds.  -At least 1,000mg fish oil per day  -Start therapy  -Physical activity, breath hard and sweat once per week. Do it for yourself-so  You feel good  -F/U 6-12 months.       (Z23) Need for prophylactic vaccination and " inoculation against influenza  Plan: Vaccine Administration, Initial [93573], FLU         VAC, SPLIT VIRUS IM > 3 YO (QUADRIVALENT)         [14590]              Gaby Bui, KHALIDA-DNP.          Injectable Influenza Immunization Documentation    1.  Is the person to be vaccinated sick today?   No    2. Does the person to be vaccinated have an allergy to a component   of the vaccine?   No  Egg Allergy Algorithm Link    3. Has the person to be vaccinated ever had a serious reaction   to influenza vaccine in the past?   No    4. Has the person to be vaccinated ever had Guillain-Barré syndrome?   No    Form completed by Kimi Mata CMA

## 2017-12-20 ASSESSMENT — ANXIETY QUESTIONNAIRES: GAD7 TOTAL SCORE: 10

## 2018-06-11 ENCOUNTER — OFFICE VISIT (OUTPATIENT)
Dept: FAMILY MEDICINE | Facility: CLINIC | Age: 43
End: 2018-06-11
Payer: COMMERCIAL

## 2018-06-11 ENCOUNTER — HOSPITAL ENCOUNTER (OUTPATIENT)
Dept: ULTRASOUND IMAGING | Facility: CLINIC | Age: 43
Discharge: HOME | End: 2018-06-11
Attending: NURSE PRACTITIONER | Admitting: NURSE PRACTITIONER
Payer: COMMERCIAL

## 2018-06-11 VITALS
WEIGHT: 234 LBS | HEART RATE: 71 BPM | OXYGEN SATURATION: 98 % | SYSTOLIC BLOOD PRESSURE: 124 MMHG | HEIGHT: 65 IN | RESPIRATION RATE: 16 BRPM | BODY MASS INDEX: 38.99 KG/M2 | DIASTOLIC BLOOD PRESSURE: 78 MMHG | TEMPERATURE: 98.6 F

## 2018-06-11 DIAGNOSIS — M79.89 SWELLING OF LEFT LOWER EXTREMITY: ICD-10-CM

## 2018-06-11 DIAGNOSIS — M79.89 SWELLING OF LEFT LOWER EXTREMITY: Primary | ICD-10-CM

## 2018-06-11 LAB — D DIMER PPP FEU-MCNC: 0.5 UG/ML FEU (ref 0–0.5)

## 2018-06-11 PROCEDURE — 93971 EXTREMITY STUDY: CPT | Mod: LT

## 2018-06-11 PROCEDURE — 99214 OFFICE O/P EST MOD 30 MIN: CPT | Performed by: NURSE PRACTITIONER

## 2018-06-11 PROCEDURE — 85379 FIBRIN DEGRADATION QUANT: CPT | Performed by: NURSE PRACTITIONER

## 2018-06-11 PROCEDURE — 36415 COLL VENOUS BLD VENIPUNCTURE: CPT | Performed by: NURSE PRACTITIONER

## 2018-06-11 NOTE — NURSING NOTE
"Chief Complaint   Patient presents with     Pain       Initial /78 (BP Location: Right arm, Patient Position: Chair, Cuff Size: Adult Regular)  Pulse 71  Temp 98.6  F (37  C) (Oral)  Resp 16  Ht 5' 5.25\" (1.657 m)  Wt 234 lb (106.1 kg)  LMP 05/20/2018 (Exact Date)  SpO2 98%  Breastfeeding? No  BMI 38.64 kg/m2 Estimated body mass index is 38.64 kg/(m^2) as calculated from the following:    Height as of this encounter: 5' 5.25\" (1.657 m).    Weight as of this encounter: 234 lb (106.1 kg).  Medication Reconciliation: complete      Health Maintenance addressed:  NONE    Lawrence Huerta CMA  .      "

## 2018-06-11 NOTE — MR AVS SNAPSHOT
After Visit Summary   6/11/2018    Elle Lambert    MRN: [id number]           Patient Information     Date Of Birth          1975        Visit Information        Provider Department      6/11/2018 1:00 PM Brittany Malcolm NP [facility]        Today's Diagnoses     Swelling of left lower extremity    -  1       Follow-ups after your visit        Follow-up notes from your care team     Return in about 1 year (around 6/11/2019) for Physical Exam.      Your next 10 appointments already scheduled     Jun 11, 2018  2:00 PM CDT   US LOWER EXTREMITY VENOUS DUPLEX LEFT with [id number]   [facility] ([facility])    [address]  [place] [address] [phone]           Please bring a list of your medicines (including vitamins, minerals and over-the-counter drugs). Also, tell your doctor about any allergies you may have. Wear comfortable clothes and leave your valuables at home.  You do not need to do anything special to prepare for your exam.  Please call the Imaging Department at your exam site with any questions.              Future tests that were ordered for you today     Open Future Orders        Priority Expected Expires Ordered    US Lower Extremity Venous Duplex Left Routine  6/11/2019 6/11/2018            Who to contact     If you have questions or need follow up information about today's clinic visit or your schedule please contact [facility] directly at [phone].  Normal or non-critical lab and imaging results will be communicated to you by MyChart, letter or phone within 4 business days after the clinic has received the results. If you do not hear from us within 7 days, please contact the clinic through MyChart or phone. If you have a critical or abnormal lab result, we will notify you by phone as soon as possible.  Submit refill requests through [employer] or call your pharmacy and they will  "forward the refill request to us. Please allow 3 business days for your refill to be completed.          Additional Information About Your Visit        Choisterhart Information     [employer] gives you secure access to your electronic health record. If you see a primary care provider, you can also send messages to your care team and make appointments. If you have questions, please call your primary care clinic.  If you do not have a primary care provider, please call [phone] and they will assist you.        Care EveryWhere ID     This is your Care EveryWhere ID. This could be used by other organizations to access your [place] medical records  [id number]        Your Vitals Were     Pulse Temperature Respirations Height Last Period Pulse Oximetry    71 98.6  F (37  C) (Oral) 16 5' 5.25\" (1.657 m) 05/20/2018 (Exact Date) 98%    Breastfeeding? BMI (Body Mass Index)                No 38.64 kg/m2           Blood Pressure from Last 3 Encounters:   06/11/18 124/78   12/19/17 122/84   06/16/17 106/58    Weight from Last 3 Encounters:   06/11/18 234 lb (106.1 kg)   12/19/17 253 lb 9.6 oz (115 kg)   06/16/17 254 lb 6.4 oz (115.4 kg)              We Performed the Following     D dimer, quantitative     DEPRESSION ACTION PLAN (DAP)        Primary Care Provider Office Phone # Fax #    Gaby Bui, APRN [facility] [phone]       [address] [place] [address]        Equal Access to Services     BONG SHAHID : Hadii ryanne ku hadasho Soomaali, waaxda luqadaha, qaybta kaalmada adeegyada, waxay raf og adeluz maria medrano . So [facility] [phone].    ATENCIÓN: Si habla español, tiene a arroyo disposición servicios gratuitos de asistencia lingüística. Llame al [phone].    We comply with applicable federal civil rights laws and [place] laws. We do not discriminate on the basis of race, color, national origin, age, disability, sex, sexual orientation, or gender identity.            Thank you!     " Thank you for choosing [facility]  for your care. Our goal is always to provide you with excellent care. Hearing back from our patients is one way we can continue to improve our services. Please take a few minutes to complete the written survey that you may receive in the mail after your visit with us. Thank you!             Your Updated Medication List - Protect others around you: Learn how to safely use, store and throw away your medicines at www.disposemymeds.org.          This list is accurate as of 6/11/18  1:31 PM.  Always use your most recent med list.                   Brand Name Dispense Instructions for use Diagnosis    acetaminophen 325 MG tablet    TYLENOL    100 tablet    Take 2 tablets (650 mg) by mouth every 4 hours as needed for mild pain or fever (greater than or equal to 38? C /100.4? F (oral) or 38.5? C/ 101.4? F (core).)    Vaginal delivery       buPROPion 150 MG 12 hr tablet    WELLBUTRIN SR    180 tablet    1 tab twice a day    Depression, unspecified depression type       escitalopram 10 MG tablet    LEXAPRO    90 tablet    Take 1 tablet (10 mg) by mouth daily    Adjustment disorder with mixed anxiety and depressed mood       ferrous sulfate 325 (65 Fe) MG tablet    IRON    90 tablet    Take 1 tablet (325 mg) by mouth daily (with breakfast)    Iron deficiency anemia, unspecified iron deficiency anemia type       loratadine 10 MG ODT tab    CLARITIN REDITABS     Take 10 mg by mouth daily        Multi-vitamin Tabs tablet      Take 1 tablet by mouth daily        OMEGA-3 FISH OIL PO           OMEPRAZOLE PO      Take 20 mg by mouth        vitamin D 2000 units tablet     100 tablet    Take 1 tablet by mouth 3 times daily

## 2018-06-11 NOTE — PROGRESS NOTES
SUBJECTIVE:   Elle Lamebrt is a 43 year old female who presents to clinic today for the following health issues:      Musculoskeletal problem/pain, some swelling in her left foot, tingling in her feet      Duration: x 3 weeks    Description  Location: behind her left knee    Intensity:  moderate, severe    Accompanying signs and symptoms: discoloration of tender to the touch, also a lump, pain up and down her leg    History  Previous similar problem: no   Previous evaluation:  none    Precipitating or alleviating factors:  Trauma or overuse: no   Aggravating factors include: sitting, walking and climbing stairs    Therapies tried and outcome: nothing    Left lower leg swelling with pain behind the knee. Present for the past three weeks. No recent travel. NO clotting issues. Non smoker. Sedentary job. No shortness of breath or chest pain.       Problem list and histories reviewed & adjusted, as indicated.  Additional history: none    Patient Active Problem List   Diagnosis     Galactorrhea not associated with childbirth     Vitamin B 12 deficiency     Bariatric surgery status     Vitamin D deficiency     Morbid obesity (H)     Mild recurrent major depression (H)     Depression, unspecified depression type     ASCUS of cervix with negative high risk HPV     Past Surgical History:   Procedure Laterality Date     GASTRIC BYPASS  2004       Social History   Substance Use Topics     Smoking status: Former Smoker     Quit date: 8/11/2013     Smokeless tobacco: Never Used     Alcohol use Yes      Comment: 2 times per month, 2 drinks per time     Family History   Problem Relation Age of Onset     CANCER Father 48     lung-smoker     Hypertension Mother      Other Cancer Maternal Grandfather      brain     Asthma Paternal Grandmother      Asthma Paternal Grandfather      Coronary Artery Disease Paternal Grandfather      Heart Failure Paternal Grandfather            Reviewed and updated as needed this visit by clinical  "staff  Tobacco  Allergies  Meds  Problems  Med Hx  Surg Hx  Fam Hx  Soc Hx        Reviewed and updated as needed this visit by Provider  Allergies  Meds  Problems  Med Hx  Surg Hx  Fam Hx         ROS:  Constitutional, HEENT, cardiovascular, pulmonary, gi and gu systems are negative, except as otherwise noted.    OBJECTIVE:     /78 (BP Location: Right arm, Patient Position: Chair, Cuff Size: Adult Regular)  Pulse 71  Temp 98.6  F (37  C) (Oral)  Resp 16  Ht 5' 5.25\" (1.657 m)  Wt 234 lb (106.1 kg)  LMP 05/20/2018 (Exact Date)  SpO2 98%  Breastfeeding? No  BMI 38.64 kg/m2  Body mass index is 38.64 kg/(m^2).  GENERAL: healthy, alert and no distress  RESP: lungs clear to auscultation - no rales, rhonchi or wheezes  CV: regular rate and rhythm, normal S1 S2, no S3 or S4, no murmur, click or rub, no peripheral edema and peripheral pulses strong  MS: trace edema in lower extremities  PSYCH: mentation appears normal, affect normal/bright        ASSESSMENT/PLAN:             1. Swelling of left lower extremity  Ultrasound today to rule out DVT. D dimer as well.   - US Lower Extremity Venous Duplex Left; Future  - D dimer, quantitative        Brittany Malcolm, NP  [facility]    "

## 2018-06-12 ASSESSMENT — PATIENT HEALTH QUESTIONNAIRE - PHQ9: SUM OF ALL RESPONSES TO PHQ QUESTIONS 1-9: 3

## 2018-10-25 DIAGNOSIS — F43.23 ADJUSTMENT DISORDER WITH MIXED ANXIETY AND DEPRESSED MOOD: ICD-10-CM

## 2018-10-25 NOTE — TELEPHONE ENCOUNTER
"Requested Prescriptions   Pending Prescriptions Disp Refills     escitalopram (LEXAPRO) 10 MG tablet  Last Written Prescription Date:  12/19/2017  Last Fill Quantity: 90 tablet,  # refills: 3   Last office visit: 12/19/2017 with prescribing provider:  Gaby Bui APRN CNP   Future Office Visit:     90 tablet 3     Sig: Take 1 tablet (10 mg) by mouth daily    SSRIs Protocol Passed    10/25/2018  3:26 PM   PHQ-9 SCORE 6/16/2017 12/19/2017 6/11/2018   Total Score MyChart - - -   Total Score 2 4 3     SHIRA-7 SCORE 6/16/2017 12/19/2017   Total Score 1 10           Passed - Recent (12 mo) or future (30 days) visit within the authorizing provider's specialty    Patient had office visit in the last 12 months or has a visit in the next 30 days with authorizing provider or within the authorizing provider's specialty.  See \"Patient Info\" tab in inbasket, or \"Choose Columns\" in Meds & Orders section of the refill encounter.             Passed - Patient is age 18 or older       Passed - No active pregnancy on record       Passed - No positive pregnancy test in last 12 months          "

## 2018-10-29 RX ORDER — ESCITALOPRAM OXALATE 10 MG/1
10 TABLET ORAL DAILY
Start: 2018-10-29

## 2018-12-27 ENCOUNTER — TELEPHONE (OUTPATIENT)
Dept: PEDIATRICS | Facility: CLINIC | Age: 43
End: 2018-12-27

## 2018-12-27 RX ORDER — ESCITALOPRAM OXALATE 10 MG/1
10 TABLET ORAL DAILY
Qty: 30 TABLET | Refills: 0 | Status: SHIPPED | OUTPATIENT
Start: 2018-12-27 | End: 2019-01-29

## 2018-12-27 NOTE — TELEPHONE ENCOUNTER
"Requested Prescriptions   Pending Prescriptions Disp Refills     escitalopram (LEXAPRO) 10 MG tablet 90 tablet 3    Last Written Prescription Date:  12/19/2017  Last Fill Quantity: 90,  # refills: 3   Last office visit: 12/19/2017 with prescribing provider:  Ramya   Future Office Visit:     Sig: Take 1 tablet (10 mg) by mouth daily    SSRIs Protocol Failed - 12/27/2018  2:16 PM       Failed - Recent (12 mo) or future (30 days) visit within the authorizing provider's specialty    Patient had office visit in the last 12 months or has a visit in the next 30 days with authorizing provider or within the authorizing provider's specialty.  See \"Patient Info\" tab in inbasket, or \"Choose Columns\" in Meds & Orders section of the refill encounter.             Passed - Patient is age 18 or older       Passed - No active pregnancy on record       Passed - No positive pregnancy test in last 12 months      Refused Prescriptions Disp Refills     escitalopram (LEXAPRO) 10 MG tablet       Sig: Take 1 tablet (10 mg) by mouth daily    SSRIs Protocol Failed - 12/27/2018  2:16 PM       Failed - Recent (12 mo) or future (30 days) visit within the authorizing provider's specialty    Patient had office visit in the last 12 months or has a visit in the next 30 days with authorizing provider or within the authorizing provider's specialty.  See \"Patient Info\" tab in inbasket, or \"Choose Columns\" in Meds & Orders section of the refill encounter.             Passed - Patient is age 18 or older       Passed - No active pregnancy on record       Passed - No positive pregnancy test in last 12 months        30 day supply given.  Patient is due for yearly physical and lab work.  Please call and assist with scheduling appointment prior to next refill   Honey PEÑA RN - Triage  [facility]    "

## 2018-12-27 NOTE — LETTER
January 10, 2019      Elle Lambert  [address] [place] [id number]        Dear Elle,       We care about your health and have reviewed your health plan including your medical conditions, medications, and lab results.  Based on this review, it is recommended that you follow up regarding the following health topic(s):  -Depression  -Wellness (Physical) Visit     We recommend you take the following action(s):  -schedule a WELLNESS (Physical) APPOINTMENT.  We will perform the following labs: Lipids (fasting cholesterol - nothing to eat except water and/or meds for 8-10 hours).     Please call us at the [facility] - [phone] (or use [employer]) to address the above recommendations.     Thank you for trusting [facility] and we appreciate the opportunity to serve you.  We look forward to supporting your healthcare needs in the future.    Healthy Regards,    Your Health Care Team  [facility]

## 2018-12-27 NOTE — TELEPHONE ENCOUNTER
Panel management encounter started.  Patient needs OV - not seen for over a year.  Kimi Mata, CMA

## 2018-12-27 NOTE — TELEPHONE ENCOUNTER
Panel Management Review      Patient has the following on her problem list:     Depression / Dysthymia review    Measure:  Needs PHQ-9 score of 4 or less during index window.  Administer PHQ-9 and if score is 5 or more, send encounter to provider for next steps.    5 - 7 month window range: last seen 12/19/17 OVERDUE    PHQ-9 SCORE 6/16/2017 12/19/2017 6/11/2018   PHQ-9 Total Score MyChart - - -   PHQ-9 Total Score 2 4 3       If PHQ-9 recheck is 5 or more, route to provider for next steps.    Patient is due for:  PHQ9      Composite cancer screening  Chart review shows that this patient is due/due soon for the following None  Summary:    Patient is due/failing the following:   PHQ9 and PHYSICAL    Action needed:   Patient needs office visit for physical and depression f/u. Needs PHQ9.    Type of outreach:    Phone, left message for patient to call back. !    Questions for provider review:    None                                                                                                                                  Kimi Mata CMA    Chart routed to Care Team .

## 2019-01-02 ENCOUNTER — TRANSFERRED RECORDS (OUTPATIENT)
Dept: HEALTH INFORMATION MANAGEMENT | Facility: CLINIC | Age: 44
End: 2019-01-02

## 2019-01-02 ENCOUNTER — MYC MEDICAL ADVICE (OUTPATIENT)
Dept: PEDIATRICS | Facility: CLINIC | Age: 44
End: 2019-01-02

## 2019-01-15 ASSESSMENT — PATIENT HEALTH QUESTIONNAIRE - PHQ9
SUM OF ALL RESPONSES TO PHQ QUESTIONS 1-9: 7
SUM OF ALL RESPONSES TO PHQ QUESTIONS 1-9: 7
10. IF YOU CHECKED OFF ANY PROBLEMS, HOW DIFFICULT HAVE THESE PROBLEMS MADE IT FOR YOU TO DO YOUR WORK, TAKE CARE OF THINGS AT HOME, OR GET ALONG WITH OTHER PEOPLE: SOMEWHAT DIFFICULT

## 2019-01-16 ASSESSMENT — PATIENT HEALTH QUESTIONNAIRE - PHQ9: SUM OF ALL RESPONSES TO PHQ QUESTIONS 1-9: 7

## 2019-01-29 ENCOUNTER — OFFICE VISIT (OUTPATIENT)
Dept: PEDIATRICS | Facility: CLINIC | Age: 44
End: 2019-01-29
Payer: COMMERCIAL

## 2019-01-29 VITALS
OXYGEN SATURATION: 97 % | WEIGHT: 236.7 LBS | BODY MASS INDEX: 39.44 KG/M2 | DIASTOLIC BLOOD PRESSURE: 64 MMHG | SYSTOLIC BLOOD PRESSURE: 104 MMHG | HEIGHT: 65 IN | HEART RATE: 72 BPM | TEMPERATURE: 97.4 F

## 2019-01-29 DIAGNOSIS — Z98.84 STATUS POST BARIATRIC SURGERY: ICD-10-CM

## 2019-01-29 DIAGNOSIS — E66.01 MORBID OBESITY (H): ICD-10-CM

## 2019-01-29 DIAGNOSIS — F33.0 MILD RECURRENT MAJOR DEPRESSION (H): ICD-10-CM

## 2019-01-29 DIAGNOSIS — Z00.00 HEALTHCARE MAINTENANCE: Primary | ICD-10-CM

## 2019-01-29 DIAGNOSIS — D50.9 IRON DEFICIENCY ANEMIA, UNSPECIFIED IRON DEFICIENCY ANEMIA TYPE: ICD-10-CM

## 2019-01-29 LAB
ALBUMIN SERPL-MCNC: 3.7 G/DL (ref 3.4–5)
ALP SERPL-CCNC: 81 U/L (ref 40–150)
ALT SERPL W P-5'-P-CCNC: 30 U/L (ref 0–50)
ANION GAP SERPL CALCULATED.3IONS-SCNC: 4 MMOL/L (ref 3–14)
AST SERPL W P-5'-P-CCNC: 21 U/L (ref 0–45)
BILIRUB SERPL-MCNC: 0.6 MG/DL (ref 0.2–1.3)
BUN SERPL-MCNC: 14 MG/DL (ref 7–30)
CALCIUM SERPL-MCNC: 8.6 MG/DL (ref 8.5–10.1)
CHLORIDE SERPL-SCNC: 106 MMOL/L (ref 94–109)
CHOLEST SERPL-MCNC: 217 MG/DL
CO2 SERPL-SCNC: 27 MMOL/L (ref 20–32)
CREAT SERPL-MCNC: 0.66 MG/DL (ref 0.52–1.04)
DEPRECATED CALCIDIOL+CALCIFEROL SERPL-MC: 37 UG/L (ref 20–75)
ERYTHROCYTE [DISTWIDTH] IN BLOOD BY AUTOMATED COUNT: 13.6 % (ref 10–15)
FERRITIN SERPL-MCNC: 84 NG/ML (ref 12–150)
FOLATE SERPL-MCNC: 32.3 NG/ML
GFR SERPL CREATININE-BSD FRML MDRD: >90 ML/MIN/{1.73_M2}
GLUCOSE SERPL-MCNC: 95 MG/DL (ref 70–99)
HCT VFR BLD AUTO: 38.1 % (ref 35–47)
HDLC SERPL-MCNC: 75 MG/DL
HGB BLD-MCNC: 12.8 G/DL (ref 11.7–15.7)
HIV 1+2 AB+HIV1 P24 AG SERPL QL IA: NONREACTIVE
LDLC SERPL CALC-MCNC: 132 MG/DL
MCH RBC QN AUTO: 32.2 PG (ref 26.5–33)
MCHC RBC AUTO-ENTMCNC: 33.6 G/DL (ref 31.5–36.5)
MCV RBC AUTO: 96 FL (ref 78–100)
NONHDLC SERPL-MCNC: 142 MG/DL
PLATELET # BLD AUTO: 359 10E9/L (ref 150–450)
POTASSIUM SERPL-SCNC: 3.9 MMOL/L (ref 3.4–5.3)
PROT SERPL-MCNC: 7.3 G/DL (ref 6.8–8.8)
PTH-INTACT SERPL-MCNC: 80 PG/ML (ref 18–80)
RBC # BLD AUTO: 3.98 10E12/L (ref 3.8–5.2)
SODIUM SERPL-SCNC: 137 MMOL/L (ref 133–144)
TRIGL SERPL-MCNC: 50 MG/DL
VIT B12 SERPL-MCNC: 382 PG/ML (ref 193–986)
WBC # BLD AUTO: 5.5 10E9/L (ref 4–11)

## 2019-01-29 PROCEDURE — 84425 ASSAY OF VITAMIN B-1: CPT | Mod: 90 | Performed by: NURSE PRACTITIONER

## 2019-01-29 PROCEDURE — 80061 LIPID PANEL: CPT | Performed by: NURSE PRACTITIONER

## 2019-01-29 PROCEDURE — 36415 COLL VENOUS BLD VENIPUNCTURE: CPT | Performed by: NURSE PRACTITIONER

## 2019-01-29 PROCEDURE — 82728 ASSAY OF FERRITIN: CPT | Performed by: NURSE PRACTITIONER

## 2019-01-29 PROCEDURE — 99000 SPECIMEN HANDLING OFFICE-LAB: CPT | Performed by: NURSE PRACTITIONER

## 2019-01-29 PROCEDURE — 80053 COMPREHEN METABOLIC PANEL: CPT | Performed by: NURSE PRACTITIONER

## 2019-01-29 PROCEDURE — 82607 VITAMIN B-12: CPT | Performed by: NURSE PRACTITIONER

## 2019-01-29 PROCEDURE — 84630 ASSAY OF ZINC: CPT | Mod: 90 | Performed by: NURSE PRACTITIONER

## 2019-01-29 PROCEDURE — 83970 ASSAY OF PARATHORMONE: CPT | Performed by: NURSE PRACTITIONER

## 2019-01-29 PROCEDURE — 99396 PREV VISIT EST AGE 40-64: CPT | Performed by: NURSE PRACTITIONER

## 2019-01-29 PROCEDURE — 87389 HIV-1 AG W/HIV-1&-2 AB AG IA: CPT | Performed by: NURSE PRACTITIONER

## 2019-01-29 PROCEDURE — 85027 COMPLETE CBC AUTOMATED: CPT | Performed by: NURSE PRACTITIONER

## 2019-01-29 PROCEDURE — 82306 VITAMIN D 25 HYDROXY: CPT | Performed by: NURSE PRACTITIONER

## 2019-01-29 PROCEDURE — 82746 ASSAY OF FOLIC ACID SERUM: CPT | Performed by: NURSE PRACTITIONER

## 2019-01-29 PROCEDURE — 82525 ASSAY OF COPPER: CPT | Mod: 90 | Performed by: NURSE PRACTITIONER

## 2019-01-29 RX ORDER — ESCITALOPRAM OXALATE 10 MG/1
10 TABLET ORAL DAILY
Qty: 90 TABLET | Refills: 3 | Status: SHIPPED | OUTPATIENT
Start: 2019-01-29 | End: 2019-11-25

## 2019-01-29 RX ORDER — FERROUS SULFATE 325(65) MG
325 TABLET ORAL
Qty: 90 TABLET | Refills: 0 | Status: SHIPPED | OUTPATIENT
Start: 2019-01-29

## 2019-01-29 RX ORDER — BUPROPION HYDROCHLORIDE 150 MG/1
TABLET, EXTENDED RELEASE ORAL
Qty: 180 TABLET | Refills: 3 | Status: SHIPPED | OUTPATIENT
Start: 2019-01-29 | End: 2019-10-10 | Stop reason: ALTCHOICE

## 2019-01-29 ASSESSMENT — ENCOUNTER SYMPTOMS
SORE THROAT: 0
PARESTHESIAS: 0
NERVOUS/ANXIOUS: 0
DIARRHEA: 0
HEMATURIA: 0
DIZZINESS: 0
MYALGIAS: 0
CONSTIPATION: 0
BREAST MASS: 0
CHILLS: 0
ARTHRALGIAS: 0
EYE PAIN: 0
HEADACHES: 0
JOINT SWELLING: 0
HEMATOCHEZIA: 0
DYSURIA: 0
WEAKNESS: 0
FREQUENCY: 0
ABDOMINAL PAIN: 0
COUGH: 0
HEARTBURN: 0
PALPITATIONS: 0
SHORTNESS OF BREATH: 0
FEVER: 0
NAUSEA: 0

## 2019-01-29 ASSESSMENT — ANXIETY QUESTIONNAIRES
7. FEELING AFRAID AS IF SOMETHING AWFUL MIGHT HAPPEN: SEVERAL DAYS
3. WORRYING TOO MUCH ABOUT DIFFERENT THINGS: MORE THAN HALF THE DAYS
1. FEELING NERVOUS, ANXIOUS, OR ON EDGE: SEVERAL DAYS
GAD7 TOTAL SCORE: 7
2. NOT BEING ABLE TO STOP OR CONTROL WORRYING: SEVERAL DAYS
IF YOU CHECKED OFF ANY PROBLEMS ON THIS QUESTIONNAIRE, HOW DIFFICULT HAVE THESE PROBLEMS MADE IT FOR YOU TO DO YOUR WORK, TAKE CARE OF THINGS AT HOME, OR GET ALONG WITH OTHER PEOPLE: SOMEWHAT DIFFICULT
5. BEING SO RESTLESS THAT IT IS HARD TO SIT STILL: NOT AT ALL
6. BECOMING EASILY ANNOYED OR IRRITABLE: SEVERAL DAYS

## 2019-01-29 ASSESSMENT — PATIENT HEALTH QUESTIONNAIRE - PHQ9
5. POOR APPETITE OR OVEREATING: SEVERAL DAYS
SUM OF ALL RESPONSES TO PHQ QUESTIONS 1-9: 6

## 2019-01-29 ASSESSMENT — MIFFLIN-ST. JEOR: SCORE: 1731.12

## 2019-01-29 NOTE — PATIENT INSTRUCTIONS
"-Look into therapist in [place]  -\"The [employer] [place]\" by Adiel Ojeda.              Preventive Health Recommendations  Female Ages 40 to 49    Yearly exam:     See your health care provider every year in order to  1. Review health changes.   2. Discuss preventive care.    3. Review your medicines if your doctor prescribed any.      Get a Pap test every three years (unless you have an abnormal result and your provider advises testing more often).      If you get Pap tests with HPV test, you only need to test every 5 years, unless you have an abnormal result. You do not need a Pap test if your uterus was removed (hysterectomy) and you have not had cancer.      You should be tested each year for STDs (sexually transmitted diseases), if you're at risk.     Ask your doctor if you should have a mammogram.      Have a colonoscopy (test for colon cancer) if someone in your family has had colon cancer or polyps before age 50.       Have a cholesterol test every 5 years.       Have a diabetes test (fasting glucose) after age 45. If you are at risk for diabetes, you should have this test every 3 years.    Shots: Get a flu shot each year. Get a tetanus shot every 10 years.     Nutrition:     Eat at least 5 servings of fruits and vegetables each day.    Eat whole-grain bread, whole-wheat pasta and brown rice instead of white grains and rice.    Get adequate Calcium and Vitamin D.      Lifestyle    Exercise at least 150 minutes a week (an average of 30 minutes a day, 5 days a week). This will help you control your weight and prevent disease.    Limit alcohol to one drink per day.    No smoking.     Wear sunscreen to prevent skin cancer.    See your dentist every six months for an exam and cleaning.  "

## 2019-01-29 NOTE — PROGRESS NOTES
SUBJECTIVE:   CC: Elle Lambert is an 43 year old woman who presents for preventive health visit.     Physical   Annual:     Getting at least 3 servings of Calcium per day:  Yes    Bi-annual eye exam:  NO    Dental care twice a year:  NO    Sleep apnea or symptoms of sleep apnea:  None    Diet:  Carbohydrate counting    Frequency of exercise:  2-3 days/week    Duration of exercise:  15-30 minutes    Taking medications regularly:  Yes    Medication side effects:  None    Additional concerns today:  No    PHQ-2 Total Score: 2    Concerns today: Patient is fasting  Due for mammogram in 2019    -------------------------------------    Today's PHQ-2 Score:   PHQ-2 (  [employer]) 2019   Q1: Little interest or pleasure in doing things 1   Q2: Feeling down, depressed or hopeless 1   PHQ-2 Score 2   Q1: Little interest or pleasure in doing things Several days   Q2: Feeling down, depressed or hopeless Several days   PHQ-2 Score 2     Abuse: Current or Past(Physical, Sexual or Emotional)- No  Do you feel safe in your environment? Yes    Social History     Tobacco Use     Smoking status: Former Smoker     Last attempt to quit: 2013     Years since quittin.4     Smokeless tobacco: Never Used   Substance Use Topics     Alcohol use: Yes     Comment: 2 times per month, 2 drinks per time     Alcohol Use 2019   If you drink alcohol do you typically have greater than 3 drinks per day OR greater than 7 drinks per week? No   No flowsheet data found.    Reviewed orders with patient.  Reviewed health maintenance and updated orders accordingly - Yes  Labs reviewed in [facility]    Mammogram Screening: Patient under age 50, mutual decision reflected in health maintenance.      Pertinent mammograms are reviewed under the imaging tab.  History of abnormal Pap smear: NO - age 30-65 PAP every 5 years with negative HPV co-testing recommended  PAP / HPV Latest Ref Rng & Units 2017   PAP - ASC-US(A)   HPV 16 DNA NEG  "Negative   HPV 18 DNA NEG Negative   OTHER HR HPV NEG Negative     Reviewed and updated as needed this visit by clinical staff  Tobacco  Allergies  Meds  Med Hx  Surg Hx  Fam Hx  Soc Hx        Reviewed and updated as needed this visit by Provider            Review of Systems   Constitutional: Negative for chills and fever.   HENT: Negative for congestion, ear pain, hearing loss and sore throat.    Eyes: Negative for pain and visual disturbance.   Respiratory: Negative for cough and shortness of breath.    Cardiovascular: Negative for chest pain, palpitations and peripheral edema.   Gastrointestinal: Negative for abdominal pain, constipation, diarrhea, heartburn, hematochezia and nausea.   Breasts:  Negative for tenderness, breast mass and discharge.   Genitourinary: Negative for dysuria, frequency, genital sores, hematuria, pelvic pain, urgency, vaginal bleeding and vaginal discharge.   Musculoskeletal: Negative for arthralgias, joint swelling and myalgias.   Skin: Negative for rash.   Neurological: Negative for dizziness, weakness, headaches and paresthesias.   Psychiatric/Behavioral: Negative for mood changes. The patient is not nervous/anxious.           OBJECTIVE:   Ht 1.654 m (5' 5.1\")   Wt 107.4 kg (236 lb 11.2 oz)   BMI 39.27 kg/m    Physical Exam  GENERAL: healthy, alert and no distress  EYES: Eyes grossly normal to inspection, PERRL and conjunctivae and sclerae normal  HENT: ear canals and TM's normal, nose and mouth without ulcers or lesions  NECK: no adenopathy, no asymmetry, masses, or scars and thyroid normal to palpation  RESP: lungs clear to auscultation - no rales, rhonchi or wheezes  BREAST: normal without masses, tenderness or nipple discharge and no palpable axillary masses or adenopathy  CV: regular rate and rhythm, normal S1 S2, no S3 or S4, no murmur, click or rub, no peripheral edema and peripheral pulses strong  ABDOMEN: soft, nontender, no hepatosplenomegaly, no masses and bowel " "sounds normal  MS: no gross musculoskeletal defects noted, no edema  SKIN: no suspicious lesions or rashes  NEURO: Normal strength and tone, mentation intact and speech normal  PSYCH: mentation appears normal, affect normal/bright    Diagnostic Test Results:  none     ASSESSMENT/PLAN:   1. Healthcare maintenance  - HIV Antigen Antibody Combo  - *MA Screening Digital Bilateral; Future  - Comprehensive metabolic panel  - Copper level  - Ferritin  - Folate  - Lipid Profile  - Parathyroid Hormone Intact  - Vitamin B1 whole blood  - Vitamin B12  - Vitamin D Deficiency  - Zinc  - CBC with platelets    2. Iron deficiency anemia, unspecified iron deficiency anemia type  Needs recheck  - ferrous sulfate (FEROSUL) 325 (65 Fe) MG tablet; Take 1 tablet (325 mg) by mouth daily (with breakfast)  Dispense: 90 tablet; Refill: 0    3. Morbid obesity (H)  Ongoing. S/P bariatric surgery. Lives very far from the weight loss clinic    4. Mild recurrent major depression (H)  Mild, stable  -Asked her to start seeing a therapist again.   - buPROPion (WELLBUTRIN SR) 150 MG 12 hr tablet; 1 tab twice a day  Dispense: 180 tablet; Refill: 3  - escitalopram (LEXAPRO) 10 MG tablet; Take 1 tablet (10 mg) by mouth daily  Dispense: 90 tablet; Refill: 3    COUNSELING:  Reviewed preventive health counseling, as reflected in patient instructions    BP Readings from Last 1 Encounters:   06/11/18 124/78     Estimated body mass index is 39.27 kg/m  as calculated from the following:    Height as of this encounter: 1.654 m (5' 5.1\").    Weight as of this encounter: 107.4 kg (236 lb 11.2 oz).    BP Screening:   Last 3 BP Readings:    BP Readings from Last 3 Encounters:   01/29/19 104/64   06/11/18 124/78   12/19/17 122/84       The following was recommended to the patient:  Re-screen BP within a year and recommended lifestyle modifications  Weight management plan: Discussed healthy diet and exercise guidelines     reports that she quit smoking about 5 years " ago. she has never used smokeless tobacco.      Counseling Resources:  ATP IV Guidelines  Pooled Cohorts Equation Calculator  Breast Cancer Risk Calculator  FRAX Risk Assessment  ICSI Preventive Guidelines  Dietary Guidelines for Americans, 2010  USDA's MyPlate  ASA Prophylaxis  Lung CA Screening    NORI Hines CNP  [facility]

## 2019-01-30 ASSESSMENT — ANXIETY QUESTIONNAIRES: GAD7 TOTAL SCORE: 7

## 2019-01-31 LAB
COPPER SERPL-MCNC: 115 UG/DL (ref 80–155)
ZINC SERPL-MCNC: 74 UG/DL (ref 60–120)

## 2019-02-01 LAB — VIT B1 BLD-MCNC: 170 NMOL/L (ref 70–180)

## 2019-08-27 ENCOUNTER — E-VISIT (OUTPATIENT)
Dept: PEDIATRICS | Facility: CLINIC | Age: 44
End: 2019-08-27

## 2019-08-27 ENCOUNTER — MYC MEDICAL ADVICE (OUTPATIENT)
Dept: PEDIATRICS | Facility: CLINIC | Age: 44
End: 2019-08-27

## 2019-08-27 DIAGNOSIS — F41.9 ANXIETY: Primary | ICD-10-CM

## 2019-08-27 PROCEDURE — 99444 ZZC PHYSICIAN ONLINE EVALUATION & MANAGEMENT SERVICE: CPT | Performed by: NURSE PRACTITIONER

## 2019-08-27 ASSESSMENT — PATIENT HEALTH QUESTIONNAIRE - PHQ9
SUM OF ALL RESPONSES TO PHQ QUESTIONS 1-9: 4
10. IF YOU CHECKED OFF ANY PROBLEMS, HOW DIFFICULT HAVE THESE PROBLEMS MADE IT FOR YOU TO DO YOUR WORK, TAKE CARE OF THINGS AT HOME, OR GET ALONG WITH OTHER PEOPLE: NOT DIFFICULT AT ALL
SUM OF ALL RESPONSES TO PHQ QUESTIONS 1-9: 4

## 2019-08-27 ASSESSMENT — ANXIETY QUESTIONNAIRES
4. TROUBLE RELAXING: SEVERAL DAYS
5. BEING SO RESTLESS THAT IT IS HARD TO SIT STILL: SEVERAL DAYS
7. FEELING AFRAID AS IF SOMETHING AWFUL MIGHT HAPPEN: NEARLY EVERY DAY
6. BECOMING EASILY ANNOYED OR IRRITABLE: MORE THAN HALF THE DAYS
GAD7 TOTAL SCORE: 14
3. WORRYING TOO MUCH ABOUT DIFFERENT THINGS: MORE THAN HALF THE DAYS
7. FEELING AFRAID AS IF SOMETHING AWFUL MIGHT HAPPEN: NEARLY EVERY DAY
1. FEELING NERVOUS, ANXIOUS, OR ON EDGE: MORE THAN HALF THE DAYS
2. NOT BEING ABLE TO STOP OR CONTROL WORRYING: NEARLY EVERY DAY
GAD7 TOTAL SCORE: 14
GAD7 TOTAL SCORE: 14

## 2019-08-27 NOTE — TELEPHONE ENCOUNTER
The soonest opening that we have is on 9/11 with the residents. Our primary providers are booked out until 9/19. Would a phone visit or evisit be appropriate? Please advise.

## 2019-08-27 NOTE — TELEPHONE ENCOUNTER
"Called the pt.    Pt states that they are currently feeling fine (as they are driving at the time of phone call with hands-free headset).    Pt assessed for self-harm. Denies ideation or plan. Denies harm to others. Denies delusions or confusion.    Pt states that they been experiencing increasing panic attacks, experiencing 4 since July. Once an attack begins, she experiences terrible chest pain that radiated upwards,     Pt states that they have been having panic attacks, \"They mostly have been centered around work.\" Also stating that many of the external triggers have \"been eliminated\", but her work is the remaining and most difficult to manage or eliminate. Their family depends on their income and is concerned that they can not leave their current job, \"Its a toxic job and it affects me so much.\"    Pt does admit to some coping strategies, that including walking around her neighborhood and going barefoot on her lawn, \"just touching the grass to center myself.\" States that she can prevent many \"impending panic attacks\".    Pt is unsure if their current Wellbutrin and Lexapro have been helpful, if ever.    Pt is requesting additional Ativan until they can be seen. After about 15-20 minutes, of taking Ativan, \"It has helped immensely.\" Pt would not like this to be a long-term strategy.    Pt agreed to a telephone visit.    - Frederick \"Mikey\" PRATIK Alan  Triage [facility]      "

## 2019-08-27 NOTE — TELEPHONE ENCOUNTER
Called patient to help her set up the phone visit and had to leave her a vm. Instructed her to call us back to set up phone visit. Can also do an e-visit.

## 2019-08-27 NOTE — TELEPHONE ENCOUNTER
MA/TC:    Please reach out to patient to schedule first available with Gaby Bui or another provider in clinic for follow-up for depression/anxiety.     Will route to PCP as FYI.    Rachel Howard RN BSN   [facility]

## 2019-08-27 NOTE — TELEPHONE ENCOUNTER
Pls assess for suicidality.    Pls let her know I'm here, just very booked out due to PCT.    I recommend she submit an e-visit or schedule a phone visit. Prefer e-visit.    I also recommend she start seeing a therapist, which we discussed at last visit.

## 2019-08-27 NOTE — TELEPHONE ENCOUNTER
Please have pt submit e-visit and we can discuss lorazepam.     Please also let her know that she needs to establish with therapy

## 2019-08-28 RX ORDER — HYDROXYZINE HYDROCHLORIDE 25 MG/1
25 TABLET, FILM COATED ORAL 3 TIMES DAILY PRN
Qty: 30 TABLET | Refills: 3 | Status: SHIPPED | OUTPATIENT
Start: 2019-08-28 | End: 2019-10-10 | Stop reason: ALTCHOICE

## 2019-08-28 ASSESSMENT — ANXIETY QUESTIONNAIRES: GAD7 TOTAL SCORE: 14

## 2019-08-28 ASSESSMENT — PATIENT HEALTH QUESTIONNAIRE - PHQ9: SUM OF ALL RESPONSES TO PHQ QUESTIONS 1-9: 4

## 2019-08-29 NOTE — TELEPHONE ENCOUNTER
(F41.9) Anxiety  (primary encounter diagnosis)  Comment: Worsening work stress. Using lots of coping skills. No SI/HI  Plan: hydrOXYzine (ATARAX) 25 MG tablet        -Stop benzos, trial of hydroxyzine. If not improving will increase lexapro

## 2019-10-09 ENCOUNTER — TELEPHONE (OUTPATIENT)
Dept: FAMILY MEDICINE | Facility: CLINIC | Age: 44
End: 2019-10-09

## 2019-10-09 NOTE — TELEPHONE ENCOUNTER
Call to patient, left message to return call.    Brynn would like 40 minutes with this patient due to multiple concerns the patient wants to address.      Please ask the patient if she can come at 2pm instead of 2:20pm. If this works for the patient, please use the unavailable slot that is blocked and make the new appt 40 minutes.     Also if patient normally goes to [facility] for care, please make sure patient is aware of all the construction to ensure she is on time.     Yajaira Bull MA

## 2019-10-10 ENCOUNTER — OFFICE VISIT (OUTPATIENT)
Dept: FAMILY MEDICINE | Facility: CLINIC | Age: 44
End: 2019-10-10
Payer: COMMERCIAL

## 2019-10-10 VITALS
WEIGHT: 220 LBS | TEMPERATURE: 98.5 F | OXYGEN SATURATION: 100 % | DIASTOLIC BLOOD PRESSURE: 80 MMHG | SYSTOLIC BLOOD PRESSURE: 120 MMHG | BODY MASS INDEX: 36.65 KG/M2 | HEART RATE: 92 BPM | HEIGHT: 65 IN

## 2019-10-10 DIAGNOSIS — R68.81 EARLY SATIETY: ICD-10-CM

## 2019-10-10 DIAGNOSIS — Z98.84 BARIATRIC SURGERY STATUS: ICD-10-CM

## 2019-10-10 DIAGNOSIS — K44.9 HIATAL HERNIA: ICD-10-CM

## 2019-10-10 DIAGNOSIS — F33.0 MILD RECURRENT MAJOR DEPRESSION (H): Primary | ICD-10-CM

## 2019-10-10 DIAGNOSIS — K21.9 GASTROESOPHAGEAL REFLUX DISEASE, ESOPHAGITIS PRESENCE NOT SPECIFIED: ICD-10-CM

## 2019-10-10 DIAGNOSIS — F41.1 GAD (GENERALIZED ANXIETY DISORDER): ICD-10-CM

## 2019-10-10 DIAGNOSIS — R39.15 URINARY URGENCY: ICD-10-CM

## 2019-10-10 LAB
ALBUMIN UR-MCNC: NEGATIVE MG/DL
APPEARANCE UR: CLEAR
BACTERIA #/AREA URNS HPF: ABNORMAL /HPF
BILIRUB UR QL STRIP: NEGATIVE
COLOR UR AUTO: YELLOW
GLUCOSE UR STRIP-MCNC: NEGATIVE MG/DL
HGB UR QL STRIP: ABNORMAL
KETONES UR STRIP-MCNC: 15 MG/DL
LEUKOCYTE ESTERASE UR QL STRIP: NEGATIVE
NITRATE UR QL: NEGATIVE
NON-SQ EPI CELLS #/AREA URNS LPF: ABNORMAL /LPF
PH UR STRIP: 7 PH (ref 5–7)
RBC #/AREA URNS AUTO: ABNORMAL /HPF
SOURCE: ABNORMAL
SP GR UR STRIP: 1.01 (ref 1–1.03)
UROBILINOGEN UR STRIP-ACNC: 0.2 EU/DL (ref 0.2–1)
WBC #/AREA URNS AUTO: ABNORMAL /HPF

## 2019-10-10 PROCEDURE — 99215 OFFICE O/P EST HI 40 MIN: CPT | Performed by: PHYSICIAN ASSISTANT

## 2019-10-10 PROCEDURE — 81001 URINALYSIS AUTO W/SCOPE: CPT | Performed by: PHYSICIAN ASSISTANT

## 2019-10-10 RX ORDER — LORAZEPAM 0.5 MG/1
0.5 TABLET ORAL DAILY PRN
Qty: 20 TABLET | Refills: 0 | Status: SHIPPED | OUTPATIENT
Start: 2019-10-10 | End: 2020-03-24

## 2019-10-10 RX ORDER — BUPROPION HYDROCHLORIDE 450 MG/1
450 TABLET, FILM COATED, EXTENDED RELEASE ORAL DAILY
Qty: 90 TABLET | Refills: 0 | Status: SHIPPED | OUTPATIENT
Start: 2019-10-10 | End: 2019-11-25 | Stop reason: DRUGHIGH

## 2019-10-10 RX ORDER — LORAZEPAM 0.5 MG/1
0.5 TABLET ORAL DAILY PRN
Refills: 0 | COMMUNITY
Start: 2019-07-23 | End: 2019-10-10

## 2019-10-10 ASSESSMENT — ANXIETY QUESTIONNAIRES
IF YOU CHECKED OFF ANY PROBLEMS ON THIS QUESTIONNAIRE, HOW DIFFICULT HAVE THESE PROBLEMS MADE IT FOR YOU TO DO YOUR WORK, TAKE CARE OF THINGS AT HOME, OR GET ALONG WITH OTHER PEOPLE: VERY DIFFICULT
GAD7 TOTAL SCORE: 20
6. BECOMING EASILY ANNOYED OR IRRITABLE: NEARLY EVERY DAY
2. NOT BEING ABLE TO STOP OR CONTROL WORRYING: NEARLY EVERY DAY
3. WORRYING TOO MUCH ABOUT DIFFERENT THINGS: NEARLY EVERY DAY
7. FEELING AFRAID AS IF SOMETHING AWFUL MIGHT HAPPEN: NEARLY EVERY DAY
1. FEELING NERVOUS, ANXIOUS, OR ON EDGE: NEARLY EVERY DAY
5. BEING SO RESTLESS THAT IT IS HARD TO SIT STILL: MORE THAN HALF THE DAYS

## 2019-10-10 ASSESSMENT — PATIENT HEALTH QUESTIONNAIRE - PHQ9
5. POOR APPETITE OR OVEREATING: NEARLY EVERY DAY
SUM OF ALL RESPONSES TO PHQ QUESTIONS 1-9: 7

## 2019-10-10 ASSESSMENT — MIFFLIN-ST. JEOR: SCORE: 1648.79

## 2019-10-10 NOTE — PROGRESS NOTES
Subjective     Elle Lambert is a 44 year old female who presents to clinic today for the following health issues:    HPI   Gastrointestinal symptoms    Duration: noticing for 2-3 months- did have gastric bypass 15 years ago    Description:   REFLUX SYMPTOMS - feels fullness- can not eat a lot- random episodes- hiccups    ABDOMINAL PAIN - epigastric area with bad hipcups that are painful.   Pain is described as gets tight and radiates to.    Intensity:  moderate, severe    Accompanying signs and symptoms:  constipation     History  Previous {similar problem: YES  Previous evaluation:  none    Aggravating factors: none     Alleviating factors: nothing    Other Therapies tried: pt relaxes to help with the episodes    Pt reports a fullness in her upper abdomen and into her throat causing her to not eat or stop eating. Fullness is intermittent and does not discriminate time of day. Denies sxs of reflux and takes omeprazole 20 mg everyday. If she misses a dose she knows it. Has never regurgitated or vomited. Since RYGB she hasn't been able to regurgitate/vomit but heaves uncontrollably. Reports BM's have been more firm and small, feels like she should be going more. Uses metamucil PRN for BM regularity but is inquiring if she should be taking consistently. Passing gas frequently but not more than normal. Tries to eat a little carbs as possible. Cannot attribute sensation to a specific type of foods.      URINARY TRACT SYMPTOMS    Duration: 2-3 months     Description  Hard time starting to urinate- does not feel like the bladder empties all the way    Intensity:  moderate, severe    Accompanying signs and symptoms:  Fever/chills: no   Flank pain no   Nausea and vomiting: no   Vaginal symptoms: none  Abdominal/Pelvic Pain: no     History  History of frequent UTI's: has not had one for years  History of kidney stones: no   Sexually Active: no   Possibility of pregnancy: No    Precipitating or alleviating factors:  "None    Therapies tried and outcome: none   Outcome:     Pt reports pelvic fullness and feels she doesn't void completely. During urination she feels her bladder is pushing on her vaginal canal. FHx of bladder prolapse in her mother.       Depression Followup  Elle has been managing her depression/anxiety sxs with Wellbutrin 150 mg BID, Lexapro 10 mg daily which has been working well until recently. Over the summer she had a situation at work that triggered painful things from her childhood and is working through these things with a therapist at  in [place]. States she has some very deep traumas. Her therapist has recommended ART therapy as well as EMDR. Ever since this \"situation\", her work environment has become toxic and the primary cause of her increased stress but cannot afford to quit at the moment however she is actively looking for a new job. She also reports that she worries about everything. Her father passed away at 49 y/o from metastatic lung cancer, he was a smoker. She has thought about death a lot if is fearful to leave her 4 y/o son without a mother.   Pt also reports she uses cannabis daily and CBD oil for her anxiety which helps significantly.     How are you doing with your depression since your last visit? Worsened with anxiety    Are you having other symptoms that might be associated with depression? Yes:  pt feels like anxiety is contributing to the sx that she is having    Have you had a significant life event?  Health Concerns     Are you feeling anxious or having panic attacks?   Yes:  chest gets tight    Do you have any concerns with your use of alcohol or other drugs? No    Social History     Tobacco Use     Smoking status: Former Smoker     Packs/day: 0.00     Years: 0.00     Pack years: 0.00     Last attempt to quit: 2013     Years since quittin.1     Smokeless tobacco: Never Used   Substance Use Topics     Alcohol use: Yes     Comment: " occasional - social     Drug use: Yes     Types: Marijuana     PHQ 2019 2019 10/10/2019   PHQ-9 Total Score 6 4 7   Q9: Thoughts of better off dead/self-harm past 2 weeks Not at all Not at all Not at all     SHIRA-7 SCORE 2019 2019 10/10/2019   Total Score - 14 (moderate anxiety) -   Total Score 7 14 20   Suicide Assessment Five-step Evaluation and Treatment (SAFE-T)    Patient Active Problem List   Diagnosis     Galactorrhea not associated with childbirth     Vitamin B 12 deficiency     Bariatric surgery status     Vitamin D deficiency     Morbid obesity (H)     Mild recurrent major depression (H)     Depression, unspecified depression type     ASCUS of cervix with negative high risk HPV     Past Surgical History:   Procedure Laterality Date     GASTRIC BYPASS         Social History     Tobacco Use     Smoking status: Former Smoker     Packs/day: 0.00     Years: 0.00     Pack years: 0.00     Last attempt to quit: 2013     Years since quittin.1     Smokeless tobacco: Never Used   Substance Use Topics     Alcohol use: Yes     Comment: occasional - social     Family History   Problem Relation Age of Onset     Lung Cancer Father 48        smoker - metastatic     Alcoholism Father         alcohol     Hypertension Mother         controlled - medication     Coronary Artery Disease Mother         recently diagnosed(64) - stent placed.  genetic     Obesity Mother         Gastric sleeve  2017     Brain Cancer Maternal Grandfather         brain     Asthma Paternal Grandmother      Asthma Paternal Grandfather      Coronary Artery Disease Paternal Grandfather      Heart Failure Paternal Grandfather          Current Outpatient Medications   Medication Sig Dispense Refill     escitalopram (LEXAPRO) 10 MG tablet Take 1 tablet (10 mg) by mouth daily 90 tablet 3     ferrous sulfate (FEROSUL) 325 (65 Fe) MG tablet Take 1 tablet (325 mg) by mouth daily (with breakfast) 90 tablet 0     loratadine  "(CLARITIN REDITABS) 10 MG dispersible tablet Take 10 mg by mouth daily       LORazepam (ATIVAN) 0.5 MG tablet Take 1 tablet (0.5 mg) by mouth daily as needed for anxiety Not intended for daily use 20 tablet 0     multivitamin, therapeutic with minerals (MULTI-VITAMIN) TABS Take 1 tablet by mouth daily       Omega-3 Fatty Acids (OMEGA-3 FISH OIL PO)        OMEPRAZOLE PO Take 20 mg by mouth       acetaminophen (TYLENOL) 325 MG tablet Take 2 tablets (650 mg) by mouth every 4 hours as needed for mild pain or fever (greater than or equal to 38  C /100.4  F (oral) or 38.5  C/ 101.4  F (core).) (Patient not taking: Reported on 1/29/2019) 100 tablet 0     buPROPion HCl ER, XL, 450 MG TB24 Take 450 mg by mouth daily 90 tablet 0     Cholecalciferol (VITAMIN D) 2000 UNITS tablet Take 1 tablet by mouth 3 times daily  100 tablet 3     Allergies   Allergen Reactions     Cefuroxime Axetil      ceftin     Nsaids      Had gastric bypass     Sulfamethoxazole-Trimethoprim      Other reaction(s): Tachycardia       Reviewed and updated as needed this visit by Provider  Tobacco  Allergies  Meds  Problems  Med Hx  Surg Hx  Fam Hx  Soc Hx          Review of Systems   ROS COMP: Constitutional, HEENT, cardiovascular, pulmonary, GI, , musculoskeletal, neuro, skin, endocrine and psych systems are negative, except as otherwise noted.    This document serves as a record of the services and decisions personally performed and made by YENNY Vega. It was created on her behalf by Isabelle Mack, a trained medical scribe. The creation of this document is based on the provider's statements to the medical scribe.  Isabelle Mack October 10, 2019 2:36 PM        Objective    /80   Pulse 92   Temp 98.5  F (36.9  C) (Oral)   Ht 1.651 m (5' 5\")   Wt 99.8 kg (220 lb)   SpO2 100%   BMI 36.61 kg/m    Body mass index is 36.61 kg/m .  Physical Exam   GENERAL: healthy, alert and no distress  RESP: lungs clear to auscultation - no " rales, rhonchi or wheezes  CV: regular rate and rhythm, normal S1 S2, no S3 or S4, no murmur, click or rub, no peripheral edema and peripheral pulses strong  ABDOMEN: mild epigastric and LUQ tenderness but no rebound, soft, nontender, no hepatosplenomegaly, no masses and bowel sounds normal  MS: no gross musculoskeletal defects noted, no edema  SKIN: no suspicious lesions or rashes  NEURO: Normal strength and tone, mentation intact and speech normal  PSYCH: mentation appears normal, affect normal/bright    Diagnostic Test Results:  Labs reviewed in Epic  Results for orders placed or performed in visit on 10/10/19 (from the past 24 hour(s))   *UA reflex to Microscopic and Culture ([place] and [facility] (except [place] and [place])   Result Value Ref Range    Color Urine Yellow     Appearance Urine Clear     Glucose Urine Negative NEG^Negative mg/dL    Bilirubin Urine Negative NEG^Negative    Ketones Urine 15 (A) NEG^Negative mg/dL    Specific Gravity Urine 1.015 1.003 - 1.035    Blood Urine Trace (A) NEG^Negative    pH Urine 7.0 5.0 - 7.0 pH    Protein Albumin Urine Negative NEG^Negative mg/dL    Urobilinogen Urine 0.2 0.2 - 1.0 EU/dL    Nitrite Urine Negative NEG^Negative    Leukocyte Esterase Urine Negative NEG^Negative    Source Midstream Urine    Urine Microscopic   Result Value Ref Range    WBC Urine 0 - 5 OTO5^0 - 5 /HPF    RBC Urine O - 2 OTO2^O - 2 /HPF    Squamous Epithelial /LPF Urine Moderate (A) FEW^Few /LPF    Bacteria Urine Many (A) NEG^Negative /HPF           Assessment & Plan     Elle was seen today for gastrointestinal problem and gastrophageal reflux.    Diagnoses and all orders for this visit:    Mild recurrent major depression (H), SHIRA (generalized anxiety disorder)  Switch/increase Wellbutrin  mg once a daily (from 150 IR mg BID), continue lexapro 10 mg for better control of anxiety. Follow up in 4 week - good/bad/indifferent. Reminded pt that although cannabis may be improving  anxiety, this may worsen depressive sxs.  -     buPROPion HCl ER, XL, 450 MG TB24; Take 450 mg by mouth daily  -     LORazepam (ATIVAN) 0.5 MG tablet; Take 1 tablet (0.5 mg) by mouth daily as needed for anxiety Not intended for daily use    Early satiety, Bariatric surgery status  Referral to Gastroenterology for EGD. Recommended pt start symptom dairy to link anxiety with foods.  -     GASTROENTEROLOGY ADULT REF PROCEDURE ONLY Dallin Birch [phone]; [facility]  -     Urine Microscopic    Urinary urgency  UA normal today. OB/GYN referral today (Dr. Parker) for further evaluation of pelvic floor fullness/pressure and FHx of bladder prolapse in her mother.  -     *UA reflex to Microscopic and Culture ([place] and [facility] (except [place] and [place])  -     OB/GYN REFERRAL    Lengthy discussion re: all health issues.      Greater than 40 minutes were spent with the patient. The majority of this time was coordinating care and counseling regarding the above diagnosis .      Return in about 4 weeks (around 11/7/2019).    The information in this document, created by the medical scribe for me, accurately reflects the services I personally performed and the decisions made by me. I have reviewed and approved this document for accuracy prior to leaving the patient care area.  October 10, 2019 2:36 PM      Brynn Trinidad PA-C  [facility] [place]

## 2019-10-10 NOTE — RESULT ENCOUNTER NOTE
Elle  Here are your recent results.  The findings in your urine are normal/not concerning.  Please follow up with Dr. Parker as we discussed.  If you have any questions please do not hesitate to contact our office via phone ([phone]) or MyChart.    Brynn Trinidad, MS, PA-C  [facility]

## 2019-10-11 ASSESSMENT — ANXIETY QUESTIONNAIRES: GAD7 TOTAL SCORE: 20

## 2019-10-26 ENCOUNTER — HEALTH MAINTENANCE LETTER (OUTPATIENT)
Age: 44
End: 2019-10-26

## 2019-11-14 ENCOUNTER — APPOINTMENT (OUTPATIENT)
Dept: SURGERY | Facility: PHYSICIAN GROUP | Age: 44
End: 2019-11-14
Payer: COMMERCIAL

## 2019-11-14 ENCOUNTER — HOSPITAL ENCOUNTER (OUTPATIENT)
Facility: CLINIC | Age: 44
Discharge: HOME | End: 2019-11-14
Attending: SURGERY | Admitting: SURGERY
Payer: COMMERCIAL

## 2019-11-14 VITALS
OXYGEN SATURATION: 96 % | WEIGHT: 213 LBS | BODY MASS INDEX: 35.49 KG/M2 | HEART RATE: 66 BPM | HEIGHT: 65 IN | DIASTOLIC BLOOD PRESSURE: 74 MMHG | RESPIRATION RATE: 16 BRPM | SYSTOLIC BLOOD PRESSURE: 127 MMHG

## 2019-11-14 LAB — UPPER GI ENDOSCOPY: NORMAL

## 2019-11-14 PROCEDURE — 25000128 H RX IP 250 OP 636: Performed by: SURGERY

## 2019-11-14 PROCEDURE — 43239 EGD BIOPSY SINGLE/MULTIPLE: CPT | Performed by: SURGERY

## 2019-11-14 PROCEDURE — 88305 TISSUE EXAM BY PATHOLOGIST: CPT | Performed by: SURGERY

## 2019-11-14 PROCEDURE — 25000125 ZZHC RX 250: Performed by: SURGERY

## 2019-11-14 PROCEDURE — G0500 MOD SEDAT ENDO SERVICE >5YRS: HCPCS | Performed by: SURGERY

## 2019-11-14 PROCEDURE — 88305 TISSUE EXAM BY PATHOLOGIST: CPT | Mod: 26 | Performed by: SURGERY

## 2019-11-14 RX ORDER — LIDOCAINE 40 MG/G
CREAM TOPICAL
Status: DISCONTINUED | OUTPATIENT
Start: 2019-11-14 | End: 2019-11-14 | Stop reason: HOSPADM

## 2019-11-14 RX ORDER — ONDANSETRON 2 MG/ML
4 INJECTION INTRAMUSCULAR; INTRAVENOUS
Status: DISCONTINUED | OUTPATIENT
Start: 2019-11-14 | End: 2019-11-14 | Stop reason: HOSPADM

## 2019-11-14 RX ORDER — FENTANYL CITRATE 50 UG/ML
INJECTION, SOLUTION INTRAMUSCULAR; INTRAVENOUS PRN
Status: DISCONTINUED | OUTPATIENT
Start: 2019-11-14 | End: 2019-11-14 | Stop reason: HOSPADM

## 2019-11-14 ASSESSMENT — MIFFLIN-ST. JEOR: SCORE: 1617.04

## 2019-11-14 NOTE — LETTER
November 7, 2019      Elle Lambert  [address] [place] [id number]        Dear Elle,       Thank you for choosing [facility]. You are scheduled for the following service(s).   Please be aware that coverage of these services is subject to the terms and limitations of your health insurance plan.  Call member services at your health plan with any benefit or coverage questions.    Date:   11-14-19      Procedure: UPPER ENDOSCOPY-EGD  Doctor: Gray           Arrival Time:  0930    *check in at Emergency/Endoscopy desk*  Procedure Time: 1000       Location:   [facility]        Endoscopy Department, First Floor (Enter through ER Doors) *         [address] [place], [place] [address] [phone] or [phone] () to reschedule          PRE-PROCEDURE CHECKLIST    If you have diabetes, ask your regular doctor for diet and medication restrictions.  If you take any antiplatelet or anticoagulant medications (such as Coumadin, Lovenox, Plavix, etc.) and have not already discussed this, please call your primary physician for advice on holding this medication.  If you take Aspirin, you may continue to do so.  If you are or may be pregnant, please discuss the risks and benefits of this procedure with your doctor.  You must arrange for a ride for the day of your exam. If you fail to arrange transportation with a responsible adult, your procedure will need to be cancelled and rescheduled. Taxi, bus and medical transport are not acceptable unless you have a responsible adult that you know & trust with you. Please arrange for this  to be able to pick you up in our department, approximately one hour after your scheduled procedure, if they are not able to stay with you.      Canceling or rescheduling   If you must cancel or reschedule your appointment, please call [phone] as soon as possible.      Upper Endoscopy or Esophagogastroduodenoscopy (EGD) is  a test performed to evaluate symptoms of persistent abdominal pain, nausea, vomiting and difficulty swallowing. It may also be used to treat various conditions of the upper gastrointestinal tract, such as bleeding, narrowing or abnormal growths.     What happens during an upper endoscopy?  On the day of your procedure, plan to spend up to one and a half hour after your arrival at the endoscopy center. The exam itself takes about 5 to 10 minutes.    Before the exam:  - You will change into a gown.   - Your medical history and medication list will be reviewed with you, unless it has already been done over the phone.   - A nurse will insert an intravenous (IV) line into your hand or arm.  - The doctor will talk to you and give you a consent form to sign.    During the exam:  - Medicine will be given through the IV line to help you relax and feel comfortable.   - Your heart rate and oxygen levels will be monitored. If your blood pressure is low, you may be given fluids through the IV line.   - The doctor will insert a flexible, hollow tube, called an endoscope, into your mouth and will advance it slowly through the esophagus, stomach and duodenum (the first part of your small intestine).   - You may have a feeling of pressure or fullness.   - If you have difficulty swallowing, and the doctor finds a narrowing in your esophagus, it may be possible for the area to be expanded-dilated during the exam.   - If abnormal tissue is found, the doctor may remove it through the endoscope (biopsy it) for closer examination. The tissue removal is painless.    After the exam:  - Any tissue samples removed during the exam will be sent to a lab for evaluation. It may take 5 to 7 working days for you to be notified of the results  - The doctor will prepare a full report for the physician who referred you for the upper endoscopy.   - The doctor will talk with you about the initial results of your exam.   - You may feel bloated after the  procedure. That is normal and should not last long.   - Your throat may feel sore for a short time.   - Following the exam, you may resume your normal diet. Avoid alcohol until the next day.   - You may resume your regular activities the day after the procedure.   - Medication given during the exam will prohibit you from driving for the rest of the day.  - A nurse will provide you with complete discharge instructions before you leave the endoscopy center. Be sure to ask the nurse for specific instructions if you take blood thinners such as Aspirin , Coumadin , Lovenox , Plavix , etc.       PREPARATION    To ensure a successful exam, please follow all instructions carefully.      The night before your exam:    STOP eating solid foods at 11:45 pm.     Clear liquids are okay to drink (examples: Gatorade , apple juice, clear broth,coffee or tea without milk or cream, etc.).     DO NOT drink red liquids or alcoholic beverages.    The day of your exam:    STOP drinking clear liquids 4 hours before your exam.     You may take your usual medications with 4 oz. of water, but it needs to be at least 4 hours prior to your procedure.    When you leave for the procedure:    Bring a list of all of your current medications, including any allergy or over-the-counter medications, unless you have already reviewed that with an Endoscopy RN over the phone.     Bring a photo ID as well as up-to-date insurance information, such as your insurance card and any referral forms that might be required by your payer.         DIRECTIONS TO THE ENDOSCOPY DEPARTMENT    From the north ([facility])  Take 35W south, exit on [address] . Get into the left hand luis, turn left (east), go one-half mile to [address] and turn left. Go north to the first stoplight, take a right on [address] and follow it to the Emergency entrance.    From the south ([facility])  Take 35N to the 35E split and exit on [address]. On [address] , turn left (west) to [address]. Turn right (north) on [address]. Go north to the first stoplight, take a right on [address] and follow it to the Emergency entrance.    From the east via 35E ([facility])  Take 35E south to [place] [address] exit. Turn right on [address] . Go west to [address]. Turn right (north) on [address]. Go to the first stoplight, take a right and follow on [address] to the Emergency entrance.    From the east via [address] ([facility])  Take [address] to [address]. Turn left (south) on [address] to [address]. Turn left (east) on [address] and follow it to the Emergency entrance.    From the west via [address] 13 ([facility])  Take [address] east to [address]. Turn right (south) on [address] to [address]. Turn left (east) on [address] and follow it to the Emergency entrance.

## 2019-11-15 ENCOUNTER — TELEPHONE (OUTPATIENT)
Dept: FAMILY MEDICINE | Facility: CLINIC | Age: 44
End: 2019-11-15

## 2019-11-15 DIAGNOSIS — K21.9 GASTROESOPHAGEAL REFLUX DISEASE, ESOPHAGITIS PRESENCE NOT SPECIFIED: ICD-10-CM

## 2019-11-15 DIAGNOSIS — K31.7 GASTRIC POLYP: ICD-10-CM

## 2019-11-15 DIAGNOSIS — Z98.84 BARIATRIC SURGERY STATUS: ICD-10-CM

## 2019-11-15 DIAGNOSIS — R68.81 EARLY SATIETY: Primary | ICD-10-CM

## 2019-11-15 LAB — COPATH REPORT: NORMAL

## 2019-11-15 NOTE — TELEPHONE ENCOUNTER
Triage: please call pt.  I just received a message from the gastroenterologist who recommended a barium swallow study to evaluate for a hiatal hernia as it was not obvious on the EGD.  Otherwise the EGD was pretty unremarkable aside from a few polyps in the stomach (very common with reflux/heartburn).  Dr. Castellano did send them for pathology to be thorough and that is still pending as of today.    If pt is in agreement for barium swallow XR please let me know and I will sign order.      Brynn Trinidad MS, PA-C

## 2019-11-15 NOTE — TELEPHONE ENCOUNTER
Patient notified by phone.  She is in agreement to do barium swallow.      SUZANNE Luna, RN, PHN  [facility]  Office: [phone]  Fax: [phone]

## 2019-11-15 NOTE — TELEPHONE ENCOUNTER
----- Message from Yajaira Horan MD sent at 11/14/2019 11:03 AM CST -----  Regarding: EGD results/recs  Brynn,  Thanks for sending Elle for an EGD. She had no evidence of gastritis or ulceration of her gastric pouch. There were small polyps in the gastric pouch and I biopsied one of them but suspect it is just hypertrophic from irritation. I couldn't tell for sure if there was evidence of hiatal hernia so I recommend ordering an XR upper GI with barium. Let me know if any other questions. Thanks!Yajaira horan

## 2019-11-21 ENCOUNTER — HOSPITAL ENCOUNTER (OUTPATIENT)
Dept: GENERAL RADIOLOGY | Facility: CLINIC | Age: 44
Discharge: HOME | End: 2019-11-21
Attending: PHYSICIAN ASSISTANT | Admitting: PHYSICIAN ASSISTANT
Payer: COMMERCIAL

## 2019-11-21 ENCOUNTER — MYC MEDICAL ADVICE (OUTPATIENT)
Dept: FAMILY MEDICINE | Facility: CLINIC | Age: 44
End: 2019-11-21

## 2019-11-21 DIAGNOSIS — K21.9 GASTROESOPHAGEAL REFLUX DISEASE, ESOPHAGITIS PRESENCE NOT SPECIFIED: ICD-10-CM

## 2019-11-21 DIAGNOSIS — R68.81 EARLY SATIETY: ICD-10-CM

## 2019-11-21 DIAGNOSIS — Z98.84 BARIATRIC SURGERY STATUS: ICD-10-CM

## 2019-11-21 PROBLEM — K44.9 HIATAL HERNIA: Status: ACTIVE | Noted: 2019-11-21

## 2019-11-21 PROCEDURE — 25500045 ZZH RX 255: Performed by: PHYSICIAN ASSISTANT

## 2019-11-21 PROCEDURE — 74220 X-RAY XM ESOPHAGUS 1CNTRST: CPT

## 2019-11-21 RX ADMIN — ANTACID/ANTIFLATULENT 4 G: 380; 550; 10; 10 GRANULE, EFFERVESCENT ORAL at 07:52

## 2019-11-21 NOTE — RESULT ENCOUNTER NOTE
Elle-  Glad we figured it out!  I'd like you to see [facility]: [facility] [place] [phone] when you are able.  I don't have any specific preference on the surgeons and have had great experiences with all of them!      Let me know if there is anything else we can do for this particular concern.    Healthy regards,    Brynn Trinidad, MS, PA-C  [facility]

## 2019-11-21 NOTE — TELEPHONE ENCOUNTER
Routing to PCP for further review/recommendations/orders.    Ekaterina Mejia RN  [facility]

## 2019-11-21 NOTE — RESULT ENCOUNTER NOTE
Triage: please call pt with results/recommendations and send me her preference of plan:     Elle  Here are your recent results.  Your esophagram does show a moderate sized hiatal hernia - here is some reading/explaination about what this is ([email or web address]).  Additionally, it notes esophageal reflux.      We have a few options.  We can try -     1. Increasing your omeprazole to 20 mg twice daily and if not helping your symptoms, we can try pantoprazole 40 mg once daily (a bit stronger).   I fear that these may improve your reflux symptoms, however, it won't change the hiatal hernia presence so you may still feel a sensation of fullness and/or feeling full sooner than typical.      2. If the medications do not provide relief I would refer you to a general surgeon to discuss whether or not any surgical repair is advisable.   I could certainly do the latter right away if you prefer.    Please let me know your thoughts as I am open to you trying either option!      If you have any questions please do not hesitate to contact our office via phone ([phone]) or MyChart.    Brynn Trinidad, MS, PA-C  [facility]

## 2019-11-25 ENCOUNTER — MYC MEDICAL ADVICE (OUTPATIENT)
Dept: FAMILY MEDICINE | Facility: CLINIC | Age: 44
End: 2019-11-25

## 2019-11-25 DIAGNOSIS — F33.0 MILD RECURRENT MAJOR DEPRESSION (H): ICD-10-CM

## 2019-11-25 RX ORDER — ESCITALOPRAM OXALATE 20 MG/1
20 TABLET ORAL DAILY
Qty: 90 TABLET | Refills: 0 | Status: SHIPPED | OUTPATIENT
Start: 2019-11-25 | End: 2020-03-20

## 2019-11-25 RX ORDER — BUPROPION HYDROCHLORIDE 150 MG/1
TABLET, EXTENDED RELEASE ORAL
Qty: 180 TABLET | Refills: 0 | Status: SHIPPED | OUTPATIENT
Start: 2019-11-25 | End: 2020-03-24

## 2020-01-04 DIAGNOSIS — F33.0 MILD RECURRENT MAJOR DEPRESSION (H): ICD-10-CM

## 2020-01-04 DIAGNOSIS — F41.1 GAD (GENERALIZED ANXIETY DISORDER): ICD-10-CM

## 2020-01-06 RX ORDER — BUPROPION HYDROCHLORIDE 450 MG/1
TABLET, FILM COATED, EXTENDED RELEASE ORAL
Qty: 90 TABLET | Refills: 0 | OUTPATIENT
Start: 2020-01-06

## 2020-01-06 NOTE — TELEPHONE ENCOUNTER
"Requested Prescriptions   Pending Prescriptions Disp Refills     buPROPion HCl ER, XL, 450 MG TB24 [Pharmacy Med Name: BUPROPION HCL ER (XL) 450MG TB24] 90 tablet 0     Sig: TAKE ONE TABLET BY MOUTH EVERY DAY       SSRIs Protocol Passed - 1/6/2020  7:37 AM        Passed - PHQ-9 score less than 5 in past 6 months     Please review last PHQ-9 score.           Passed - Medication is Bupropion     If the medication is Bupropion (Wellbutrin), and the patient is taking for smoking cessation; OK to refill.          Passed - Medication is active on med list        Passed - Patient is age 18 or older        Passed - No active pregnancy on record        Passed - No positive pregnancy test in last 12 months        Passed - Recent (6 mo) or future (30 days) visit within the authorizing provider's specialty     Patient had office visit in the last 6 months or has a visit in the next 30 days with authorizing provider or within the authorizing provider's specialty.  See \"Patient Info\" tab in inbasket, or \"Choose Columns\" in Meds & Orders section of the refill encounter.            buPROPion HCl ER, XL, 450 MG TB24 (Discontinued) 90 tablet 0 10/10/2019 11/25/2019       Last Written Prescription Date:  10/10/2019  Last Fill Quantity: 90,  # refills: 0   Last office visit: 10/10/2019 with prescribing provider:  Dr gómez   Future Office Visit:  Unknown     "

## 2020-01-06 NOTE — TELEPHONE ENCOUNTER
"Requested Prescriptions   Pending Prescriptions Disp Refills     buPROPion HCl ER, XL, 450 MG TB24 [Pharmacy Med Name: BUPROPION HCL ER (XL) 450MG TB24] 90 tablet 0     Sig: TAKE ONE TABLET BY MOUTH EVERY DAY       SSRIs Protocol Passed - 1/4/2020  4:08 AM        Passed - PHQ-9 score less than 5 in past 6 months     Please review last PHQ-9 score.           Passed - Medication is Bupropion     If the medication is Bupropion (Wellbutrin), and the patient is taking for smoking cessation; OK to refill.          Passed - Medication is active on med list        Passed - Patient is age 18 or older        Passed - No active pregnancy on record        Passed - No positive pregnancy test in last 12 months        Passed - Recent (6 mo) or future (30 days) visit within the authorizing provider's specialty     Patient had office visit in the last 6 months or has a visit in the next 30 days with authorizing provider or within the authorizing provider's specialty.  See \"Patient Info\" tab in inbasket, or \"Choose Columns\" in Meds & Orders section of the refill encounter.            Last Written Prescription Date:  11/25/2019  Last Fill Quantity: 180,  # refills: 0   Last office visit: 10/10/2019 with prescribing provider:  10/10/2019   Future Office Visit:    PHQ-9 SCORE 1/29/2019 8/27/2019 10/10/2019   PHQ-9 Total Score MyChart - 4 (Minimal depression) -   PHQ-9 Total Score 6 4 7     "

## 2020-03-15 ENCOUNTER — HEALTH MAINTENANCE LETTER (OUTPATIENT)
Age: 45
End: 2020-03-15

## 2020-03-20 ENCOUNTER — MYC MEDICAL ADVICE (OUTPATIENT)
Dept: FAMILY MEDICINE | Facility: CLINIC | Age: 45
End: 2020-03-20

## 2020-03-20 DIAGNOSIS — F33.0 MILD RECURRENT MAJOR DEPRESSION (H): ICD-10-CM

## 2020-03-20 RX ORDER — ESCITALOPRAM OXALATE 20 MG/1
TABLET ORAL
Qty: 90 TABLET | Refills: 1 | Status: SHIPPED | OUTPATIENT
Start: 2020-03-20 | End: 2020-03-24 | Stop reason: ALTCHOICE

## 2020-03-20 NOTE — TELEPHONE ENCOUNTER
OK for fill.  Please send "[employer] PHQ9/SHIRA to update chart.      Brynn Trinidad MBA, MS, PA-C

## 2020-03-20 NOTE — TELEPHONE ENCOUNTER
"Requested Prescriptions   Pending Prescriptions Disp Refills     escitalopram (LEXAPRO) 20 MG tablet [Pharmacy Med Name: ESCITALOPRAM OXALATE 20MG TB] 90 tablet 0     Sig: TAKE ONE TABLET BY MOUTH EVERY DAY   Last Written Prescription Date:  11/25/19  Last Fill Quantity: 90,  # refills: 0   Last office visit: 10/10/2019 with prescribing provider:  Amos Pollack Office Visit:    PHQ 1/29/2019 8/27/2019 10/10/2019   PHQ-9 Total Score 6 4 7   Q9: Thoughts of better off dead/self-harm past 2 weeks Not at all Not at all Not at all         SSRIs Protocol Failed - 3/20/2020  4:05 AM        Failed - PHQ-9 score less than 5 in past 6 months     Please review last PHQ-9 score.           Passed - Medication is active on med list        Passed - Patient is age 18 or older        Passed - No active pregnancy on record        Passed - No positive pregnancy test in last 12 months        Passed - Recent (6 mo) or future (30 days) visit within the authorizing provider's specialty     Patient had office visit in the last 6 months or has a visit in the next 30 days with authorizing provider or within the authorizing provider's specialty.  See \"Patient Info\" tab in inbasket, or \"Choose Columns\" in Meds & Orders section of the refill encounter.                 "

## 2020-03-23 ASSESSMENT — PATIENT HEALTH QUESTIONNAIRE - PHQ9
10. IF YOU CHECKED OFF ANY PROBLEMS, HOW DIFFICULT HAVE THESE PROBLEMS MADE IT FOR YOU TO DO YOUR WORK, TAKE CARE OF THINGS AT HOME, OR GET ALONG WITH OTHER PEOPLE: EXTREMELY DIFFICULT
SUM OF ALL RESPONSES TO PHQ QUESTIONS 1-9: 14
SUM OF ALL RESPONSES TO PHQ QUESTIONS 1-9: 14

## 2020-03-23 ASSESSMENT — ANXIETY QUESTIONNAIRES
7. FEELING AFRAID AS IF SOMETHING AWFUL MIGHT HAPPEN: MORE THAN HALF THE DAYS
5. BEING SO RESTLESS THAT IT IS HARD TO SIT STILL: MORE THAN HALF THE DAYS
GAD7 TOTAL SCORE: 18
7. FEELING AFRAID AS IF SOMETHING AWFUL MIGHT HAPPEN: MORE THAN HALF THE DAYS
6. BECOMING EASILY ANNOYED OR IRRITABLE: MORE THAN HALF THE DAYS
3. WORRYING TOO MUCH ABOUT DIFFERENT THINGS: NEARLY EVERY DAY
4. TROUBLE RELAXING: NEARLY EVERY DAY
1. FEELING NERVOUS, ANXIOUS, OR ON EDGE: NEARLY EVERY DAY
GAD7 TOTAL SCORE: 18
2. NOT BEING ABLE TO STOP OR CONTROL WORRYING: NEARLY EVERY DAY

## 2020-03-24 ENCOUNTER — VIRTUAL VISIT (OUTPATIENT)
Dept: FAMILY MEDICINE | Facility: CLINIC | Age: 45
End: 2020-03-24
Payer: COMMERCIAL

## 2020-03-24 DIAGNOSIS — F33.0 MILD RECURRENT MAJOR DEPRESSION (H): ICD-10-CM

## 2020-03-24 DIAGNOSIS — F41.1 GAD (GENERALIZED ANXIETY DISORDER): ICD-10-CM

## 2020-03-24 PROCEDURE — 99214 OFFICE O/P EST MOD 30 MIN: CPT | Mod: TEL | Performed by: PHYSICIAN ASSISTANT

## 2020-03-24 RX ORDER — HYDROXYZINE HYDROCHLORIDE 25 MG/1
TABLET, FILM COATED ORAL
COMMUNITY
Start: 2020-01-01 | End: 2021-03-22

## 2020-03-24 RX ORDER — BUPROPION HYDROCHLORIDE 150 MG/1
TABLET, EXTENDED RELEASE ORAL
Qty: 180 TABLET | Refills: 0
Start: 2020-03-24 | End: 2020-06-08

## 2020-03-24 RX ORDER — CITALOPRAM HYDROBROMIDE 20 MG/1
20 TABLET ORAL DAILY
Qty: 30 TABLET | Refills: 1 | Status: SHIPPED | OUTPATIENT
Start: 2020-03-24 | End: 2020-05-19

## 2020-03-24 RX ORDER — LORAZEPAM 0.5 MG/1
0.5 TABLET ORAL DAILY PRN
Qty: 20 TABLET | Refills: 0 | Status: SHIPPED | OUTPATIENT
Start: 2020-03-24 | End: 2020-06-08

## 2020-03-24 ASSESSMENT — PATIENT HEALTH QUESTIONNAIRE - PHQ9: SUM OF ALL RESPONSES TO PHQ QUESTIONS 1-9: 14

## 2020-03-24 ASSESSMENT — ANXIETY QUESTIONNAIRES: GAD7 TOTAL SCORE: 18

## 2020-03-24 NOTE — TELEPHONE ENCOUNTER
Made Telephone visit with YENNY Vega at 10:20am om 03/24/2020 per Bijal Bull MA.    Emma Patel CMA

## 2020-03-24 NOTE — PROGRESS NOTES
"  SUBJECTIVE:                                                    Elle Lambert is a 44 year old female who is being evaluated via a telephone visit.      The patient has been notified of following:     \"This telephone visit will be conducted via a call between you and your physician/provider. We have found that certain health care needs can be provided without the need for a physical exam.  This service lets us provide the care you need with a short phone conversation.  If a prescription is necessary we can send it directly to your pharmacy.  If lab work is needed we can place an order for that and you can then stop by our lab to have the test done at a later time.    We will bill your insurance company for this service.  Please check with your medical insurance if this type of visit is covered. You may be responsible for the cost of this type of visit if insurance coverage is denied.  The typical cost is $30 (10min), $59 (11-20min) and $85 (21-30min).  Most often these visits are shorter than 10 minutes.    If during the course of the call the physician/provider feels a telephone visit is not appropriate, you will not be charged for this service.\"       Consent has been obtained for this service by care team member: yes.   See the scanned image in the medical record.    Elle Lambert complains of  Depression and Anxiety      Depression and Anxiety Follow-Up  Increased from Wellbutrin 150 BID to 450 mg daily (XR formulation) and continued lexapro 10 mg in October, went back to 150 mg BID and increased lexapro from 10 to 20 mg.  Unsure of significant change from that dose adjustment.      Currently lots of anxiety and depression is \"sneaking through\".      How are you doing with your depression since your last visit? No change    How are you doing with your anxiety since your last visit?  Worsened     Are you having other symptoms that might be associated with depression or anxiety? Yes, lack of focus and " concentration.     Have you had a significant life event? No     Do you have any concerns with your use of alcohol or other drugs? No    Therapy now bi-weekly - ART therapy    Sertraline 50 mg - no effect - 2016 - didn't feel good on this RX.  Otherwise has only been on Lexapro and Wellbutrin since .  Social History     Tobacco Use     Smoking status: Former Smoker     Packs/day: 0.00     Years: 0.00     Pack years: 0.00     Last attempt to quit: 2013     Years since quittin.6     Smokeless tobacco: Never Used   Substance Use Topics     Alcohol use: Yes     Comment: occasional - social     Drug use: Yes     Types: Marijuana     PHQ 2019 10/10/2019 3/23/2020   PHQ-9 Total Score 4 7 14   Q9: Thoughts of better off dead/self-harm past 2 weeks Not at all Not at all Not at all     SHIRA-7 SCORE 2019 10/10/2019 3/23/2020   Total Score 14 (moderate anxiety) - 18 (severe anxiety)   Total Score 14 20 18     Last PHQ-9 3/23/2020   1.  Little interest or pleasure in doing things 2   2.  Feeling down, depressed, or hopeless 2   3.  Trouble falling or staying asleep, or sleeping too much 1   4.  Feeling tired or having little energy 3   5.  Poor appetite or overeating 0   6.  Feeling bad about yourself 2   7.  Trouble concentrating 3   8.  Moving slowly or restless 1   Q9: Thoughts of better off dead/self-harm past 2 weeks 0   PHQ-9 Total Score 14   Difficulty at work, home, or with people -     SHIRA-7  3/23/2020   1. Feeling nervous, anxious, or on edge 3   2. Not being able to stop or control worrying 3   3. Worrying too much about different things 3   4. Trouble relaxing 3   5. Being so restless that it is hard to sit still 2   6. Becoming easily annoyed or irritable 2   7. Feeling afraid, as if something awful might happen 2   SHIRA-7 Total Score 18   If you checked any problems, how difficult have they made it for you to do your work, take care of things at home, or get along with other people? -          Suicide Assessment Five-step Evaluation and Treatment (SAFE-T)      I have reviewed and updated the patient's Past Medical History, Social History, Family History and Medication List.    ALLERGIES  Cefuroxime axetil; Nsaids; and Sulfamethoxazole-trimethoprim    Yajaira Bull MA        Assessment/Plan:  Elle was seen today for depression and anxiety.    Diagnoses and all orders for this visit:    Mild recurrent major depression (H)  SHIRA (generalized anxiety disorder)  Suboptimally controlled.  Stop lexapro and start citalopram.  Continue bupropion 150 mg BID.  Encouraged to continue therapy.  Lorazepam PRN (not intended for daily use).  Verbal contract for safety.  -     LORazepam (ATIVAN) 0.5 MG tablet; Take 1 tablet (0.5 mg) by mouth daily as needed for anxiety Not intended for daily use  -     buPROPion (WELLBUTRIN SR) 150 MG 12 hr tablet; 1 tab twice a day  -     citalopram (CELEXA) 20 MG tablet; Take 1 tablet (20 mg) by mouth daily          I have reviewed the note as documented above.  This accurately captures the substance of my conversation with the patient, Elle Lambert      Total time of call between patient and provider was 18:57 minutes         Return in about 3 weeks (around 4/14/2020).      Brynn Trinidad MS, PA-C    [facility]

## 2020-03-24 NOTE — TELEPHONE ENCOUNTER
I certainly can, but would like to do a phone visit (we need to have a touch point every 6 months for controlled substances).    Please assist with scheduling       Brynn Trinidad MBA, MS, PA-C

## 2020-03-24 NOTE — TELEPHONE ENCOUNTER
Please see my chart message below     Please review and advise     Thank you     Penelope Urena RN, BSN  [facility]

## 2020-05-11 ENCOUNTER — OFFICE VISIT (OUTPATIENT)
Dept: URGENT CARE | Facility: URGENT CARE | Age: 45
End: 2020-05-11
Payer: COMMERCIAL

## 2020-05-11 ENCOUNTER — ANCILLARY PROCEDURE (OUTPATIENT)
Dept: GENERAL RADIOLOGY | Facility: CLINIC | Age: 45
End: 2020-05-11
Attending: FAMILY MEDICINE
Payer: COMMERCIAL

## 2020-05-11 VITALS
BODY MASS INDEX: 35.3 KG/M2 | RESPIRATION RATE: 20 BRPM | HEART RATE: 74 BPM | OXYGEN SATURATION: 97 % | WEIGHT: 212.1 LBS | TEMPERATURE: 98 F | DIASTOLIC BLOOD PRESSURE: 66 MMHG | SYSTOLIC BLOOD PRESSURE: 110 MMHG

## 2020-05-11 DIAGNOSIS — R10.9 RIGHT FLANK PAIN: Primary | ICD-10-CM

## 2020-05-11 DIAGNOSIS — K59.01 SLOW TRANSIT CONSTIPATION: ICD-10-CM

## 2020-05-11 LAB
ALBUMIN SERPL-MCNC: 3.7 G/DL (ref 3.4–5)
ALBUMIN UR-MCNC: NEGATIVE MG/DL
ALP SERPL-CCNC: 93 U/L (ref 40–150)
ALT SERPL W P-5'-P-CCNC: 33 U/L (ref 0–50)
ANION GAP SERPL CALCULATED.3IONS-SCNC: 3 MMOL/L (ref 3–14)
APPEARANCE UR: CLEAR
AST SERPL W P-5'-P-CCNC: 18 U/L (ref 0–45)
BASOPHILS # BLD AUTO: 0 10E9/L (ref 0–0.2)
BASOPHILS NFR BLD AUTO: 0.1 %
BILIRUB SERPL-MCNC: 0.8 MG/DL (ref 0.2–1.3)
BILIRUB UR QL STRIP: ABNORMAL
BUN SERPL-MCNC: 14 MG/DL (ref 7–30)
CALCIUM SERPL-MCNC: 8.5 MG/DL (ref 8.5–10.1)
CHLORIDE SERPL-SCNC: 104 MMOL/L (ref 94–109)
CO2 SERPL-SCNC: 29 MMOL/L (ref 20–32)
COLOR UR AUTO: YELLOW
CREAT SERPL-MCNC: 0.72 MG/DL (ref 0.52–1.04)
CRP SERPL-MCNC: <2.9 MG/L (ref 0–8)
DIFFERENTIAL METHOD BLD: NORMAL
EOSINOPHIL # BLD AUTO: 0.1 10E9/L (ref 0–0.7)
EOSINOPHIL NFR BLD AUTO: 1.4 %
ERYTHROCYTE [DISTWIDTH] IN BLOOD BY AUTOMATED COUNT: 13.6 % (ref 10–15)
ERYTHROCYTE [SEDIMENTATION RATE] IN BLOOD BY WESTERGREN METHOD: 7 MM/H (ref 0–20)
GFR SERPL CREATININE-BSD FRML MDRD: >90 ML/MIN/{1.73_M2}
GLUCOSE SERPL-MCNC: 87 MG/DL (ref 70–99)
GLUCOSE UR STRIP-MCNC: NEGATIVE MG/DL
HCG UR QL: NEGATIVE
HCT VFR BLD AUTO: 38.6 % (ref 35–47)
HGB BLD-MCNC: 12.7 G/DL (ref 11.7–15.7)
HGB UR QL STRIP: NEGATIVE
KETONES UR STRIP-MCNC: ABNORMAL MG/DL
LEUKOCYTE ESTERASE UR QL STRIP: NEGATIVE
LIPASE SERPL-CCNC: 101 U/L (ref 73–393)
LYMPHOCYTES # BLD AUTO: 2.1 10E9/L (ref 0.8–5.3)
LYMPHOCYTES NFR BLD AUTO: 28.2 %
MCH RBC QN AUTO: 31.8 PG (ref 26.5–33)
MCHC RBC AUTO-ENTMCNC: 32.9 G/DL (ref 31.5–36.5)
MCV RBC AUTO: 97 FL (ref 78–100)
MONOCYTES # BLD AUTO: 0.7 10E9/L (ref 0–1.3)
MONOCYTES NFR BLD AUTO: 9.6 %
NEUTROPHILS # BLD AUTO: 4.5 10E9/L (ref 1.6–8.3)
NEUTROPHILS NFR BLD AUTO: 60.7 %
NITRATE UR QL: NEGATIVE
PH UR STRIP: 5.5 PH (ref 5–7)
PLATELET # BLD AUTO: 295 10E9/L (ref 150–450)
POTASSIUM SERPL-SCNC: 3.8 MMOL/L (ref 3.4–5.3)
PROT SERPL-MCNC: 7.6 G/DL (ref 6.8–8.8)
RBC # BLD AUTO: 3.99 10E12/L (ref 3.8–5.2)
SODIUM SERPL-SCNC: 136 MMOL/L (ref 133–144)
SOURCE: ABNORMAL
SP GR UR STRIP: >1.03 (ref 1–1.03)
UROBILINOGEN UR STRIP-ACNC: 0.2 EU/DL (ref 0.2–1)
WBC # BLD AUTO: 7.4 10E9/L (ref 4–11)

## 2020-05-11 PROCEDURE — 85652 RBC SED RATE AUTOMATED: CPT | Performed by: FAMILY MEDICINE

## 2020-05-11 PROCEDURE — 74019 RADEX ABDOMEN 2 VIEWS: CPT

## 2020-05-11 PROCEDURE — 80053 COMPREHEN METABOLIC PANEL: CPT | Performed by: FAMILY MEDICINE

## 2020-05-11 PROCEDURE — 86140 C-REACTIVE PROTEIN: CPT | Performed by: FAMILY MEDICINE

## 2020-05-11 PROCEDURE — 36415 COLL VENOUS BLD VENIPUNCTURE: CPT | Performed by: FAMILY MEDICINE

## 2020-05-11 PROCEDURE — 83690 ASSAY OF LIPASE: CPT | Performed by: FAMILY MEDICINE

## 2020-05-11 PROCEDURE — 85025 COMPLETE CBC W/AUTO DIFF WBC: CPT | Performed by: FAMILY MEDICINE

## 2020-05-11 PROCEDURE — 99214 OFFICE O/P EST MOD 30 MIN: CPT | Performed by: FAMILY MEDICINE

## 2020-05-11 PROCEDURE — 81003 URINALYSIS AUTO W/O SCOPE: CPT | Performed by: FAMILY MEDICINE

## 2020-05-11 PROCEDURE — 81025 URINE PREGNANCY TEST: CPT | Performed by: FAMILY MEDICINE

## 2020-05-11 NOTE — PROGRESS NOTES
SUBJECTIVE:    Chief Complaint   Patient presents with     Urgent Care     Abdominal Pain     Abdominal Pain-Lower right side persistent for 48hrs- started 1month ago      HPI:  Elle Lambert is a 45 year old female who presents with the CC of abdominal  pain.    Pain is located in the RUQ, RLQ and right flank area, with radiation to None.  The pain is characterized as aching and cramping,   Pain at worst is a level 4 on a scale of 1-10.   Pain has been present for 3 week(s) and is fluctuating.  EXACERBATING FACTORS: nothing  RELIEVING FACTORS: nothing.  ASSOCIATED SX: nausea and constipation.  Patient denies vomiting, diarrhea, bloating, melena, dysuria, frequency, hematuria, fever and chills     Last time the patient passed stool- today-  Sometimes constipation-  Sometimes loose-  Takes prunes about 3-4 x per week for constipation  Has had gastric bypass-  Has frequent issues of gas  Last time the patient urinated- today-  No dysuria,  No history of kidney stones  Last time the patient was eating/ drinking -today,  Normal appetite today, nausea yesterday    Surgical History :      Appendectomy   No  cholecystectomy    No   Colon or bowel surgery -  No- lower bowel-  Had Gastric bypass  Diverticulitis history -   No  Endometriosis -  No-  Still has menses- but has not had sex in the past month  hysterectomy    No    Past Medical History:   Diagnosis Date     ASCUS of cervix with negative high risk HPV 06/17/2017 06/17/17 ASCUS, Neg HPV     Depressive disorder 8/2015    PPD after 11/25/2014 birth - was taking low dose of Zoloft     Gastric polyp - noted on 2019 EGD     2019 endoscopy     Other and unspecified anterior pituitary hyperfunction 11/2001    idiopathic hyperprolactinemia - followed by endocrinologist     Vitamin B 12 deficiency      Patient Active Problem List   Diagnosis     Galactorrhea not associated with childbirth     Vitamin B 12 deficiency     Bariatric surgery status     Vitamin D deficiency      Morbid obesity (H)     Mild recurrent major depression (H)     Depression, unspecified depression type     ASCUS of cervix with negative high risk HPV     Gastric polyp - noted on 2019 EGD     Hiatal hernia     Gastroesophageal reflux disease, esophagitis presence not specified       ALLERGIES:  Cefuroxime axetil; Nsaids; and Sulfamethoxazole-trimethoprim    MEDs  buPROPion (WELLBUTRIN SR) 150 MG 12 hr tablet, 1 tab twice a day  Cholecalciferol (VITAMIN D) 2000 UNITS tablet, Take 1 tablet by mouth 3 times daily   citalopram (CELEXA) 20 MG tablet, Take 1 tablet (20 mg) by mouth daily  ferrous sulfate (FEROSUL) 325 (65 Fe) MG tablet, Take 1 tablet (325 mg) by mouth daily (with breakfast)  loratadine (CLARITIN REDITABS) 10 MG dispersible tablet, Take 10 mg by mouth daily  LORazepam (ATIVAN) 0.5 MG tablet, Take 1 tablet (0.5 mg) by mouth daily as needed for anxiety Not intended for daily use  multivitamin, therapeutic with minerals (MULTI-VITAMIN) TABS, Take 1 tablet by mouth daily  Omega-3 Fatty Acids (OMEGA-3 FISH OIL PO),   OMEPRAZOLE PO, Take 20 mg by mouth  hydrOXYzine (ATARAX) 25 MG tablet,     No current facility-administered medications on file prior to visit.       Social History     Tobacco Use     Smoking status: Former Smoker     Packs/day: 0.00     Years: 0.00     Pack years: 0.00     Last attempt to quit: 2013     Years since quittin.7     Smokeless tobacco: Never Used   Substance Use Topics     Alcohol use: Yes     Comment: occasional - social       Family History   Problem Relation Age of Onset     Lung Cancer Father 48        smoker - metastatic     Alcoholism Father         alcohol     Hypertension Mother         controlled - medication     Coronary Artery Disease Mother         recently diagnosed(64) - stent placed.  genetic     Obesity Mother         Gastric sleeve  2017     Brain Cancer Maternal Grandfather         brain     Asthma Paternal Grandmother      Asthma Paternal Grandfather       Coronary Artery Disease Paternal Grandfather      Heart Failure Paternal Grandfather          Review Of Systems    Constitutional:  Negative for fevers, chills, fatigue  Skin: negative for rash or lesions  Eyes: negative for eye pain, visual changes  Ears/Nose/Throat: negative for earache  , sinus trouble, persistent sore throat  Respiratory: No shortness of breath, dyspnea on exertion     Cardiovascular: negative for, palpitations, tachycardia or chest pain  Gastrointestinal: negative for vomiting, has right side abdominal pain    Genitourinary: negative for dysuria or hematuria  Back: negative for pain with back movement,  CVA pain  Musculoskeletal: negative for generalized joint pain, joint swelling or joint stiffness  Hematologic/Lymphatic/Immunologic: negative for allergies or swollen nodes  Endocrine: negative for thyroid disorder or diabetes    OBJECTIVE:  /66 (BP Location: Right arm, Patient Position: Sitting, Cuff Size: Adult Large)   Pulse 74   Temp 98  F (36.7  C) (Tympanic)   Resp 20   Wt 96.2 kg (212 lb 1.6 oz)   LMP 04/27/2020   SpO2 97%   Breastfeeding No   BMI 35.30 kg/m    GENERAL APPEARANCE: alert, moderate distress and cooperative  EYES: EOMI,  PERRL, conjunctiva clear  HENT: ear canals and TM's normal.  Nose and mouth without ulcers, erythema or lesions  NECK: supple, nontender, no lymphadenopathy  RESP: lungs clear to auscultation - no rales, rhonchi or wheezes  CV: regular rates and rhythm, normal S1 S2, no murmur noted  ABDOMEN:  soft, nontender, no HSM or masses and bowel sounds normal  NEURO: Normal strength and tone, sensory exam grossly normal,  normal speech and mentation  SKIN: no suspicious lesions or rashes      Labs:    Results for orders placed or performed in visit on 05/11/20   XR Abdomen 2 Views     Status: None    Narrative    ABDOMEN TWO VIEWS 5/11/2020 4:39 PM     HISTORY: Right flank pain.    COMPARISON: None.      Impression    IMPRESSION: Surgical sutures  noted in the region of the  gastroesophageal junction. Nonobstructive gas pattern. No abnormal  calculi demonstrated. Moderate volume of retained colonic stool,  predominantly in the ascending colon.    ALIRIO FERRERA MD   Comprehensive metabolic panel     Status: None   Result Value Ref Range    Sodium 136 133 - 144 mmol/L    Potassium 3.8 3.4 - 5.3 mmol/L    Chloride 104 94 - 109 mmol/L    Carbon Dioxide 29 20 - 32 mmol/L    Anion Gap 3 3 - 14 mmol/L    Glucose 87 70 - 99 mg/dL    Urea Nitrogen 14 7 - 30 mg/dL    Creatinine 0.72 0.52 - 1.04 mg/dL    GFR Estimate >90 >60 mL/min/[1.73_m2]    GFR Estimate If Black >90 >60 mL/min/[1.73_m2]    Calcium 8.5 8.5 - 10.1 mg/dL    Bilirubin Total 0.8 0.2 - 1.3 mg/dL    Albumin 3.7 3.4 - 5.0 g/dL    Protein Total 7.6 6.8 - 8.8 g/dL    Alkaline Phosphatase 93 40 - 150 U/L    ALT 33 0 - 50 U/L    AST 18 0 - 45 U/L   Lipase     Status: None   Result Value Ref Range    Lipase 101 73 - 393 U/L   CBC with platelets differential     Status: None   Result Value Ref Range    WBC 7.4 4.0 - 11.0 10e9/L    RBC Count 3.99 3.8 - 5.2 10e12/L    Hemoglobin 12.7 11.7 - 15.7 g/dL    Hematocrit 38.6 35.0 - 47.0 %    MCV 97 78 - 100 fl    MCH 31.8 26.5 - 33.0 pg    MCHC 32.9 31.5 - 36.5 g/dL    RDW 13.6 10.0 - 15.0 %    Platelet Count 295 150 - 450 10e9/L    % Neutrophils 60.7 %    % Lymphocytes 28.2 %    % Monocytes 9.6 %    % Eosinophils 1.4 %    % Basophils 0.1 %    Absolute Neutrophil 4.5 1.6 - 8.3 10e9/L    Absolute Lymphocytes 2.1 0.8 - 5.3 10e9/L    Absolute Monocytes 0.7 0.0 - 1.3 10e9/L    Absolute Eosinophils 0.1 0.0 - 0.7 10e9/L    Absolute Basophils 0.0 0.0 - 0.2 10e9/L    Diff Method Automated Method    UA reflex to Microscopic and Culture     Status: Abnormal    Specimen: Midstream Urine   Result Value Ref Range    Color Urine Yellow     Appearance Urine Clear     Glucose Urine Negative NEG^Negative mg/dL    Bilirubin Urine Small (A) NEG^Negative    Ketones Urine Trace (A)  NEG^Negative mg/dL    Specific Gravity Urine >1.030 1.003 - 1.035    Blood Urine Negative NEG^Negative    pH Urine 5.5 5.0 - 7.0 pH    Protein Albumin Urine Negative NEG^Negative mg/dL    Urobilinogen Urine 0.2 0.2 - 1.0 EU/dL    Nitrite Urine Negative NEG^Negative    Leukocyte Esterase Urine Negative NEG^Negative    Source Midstream Urine    Erythrocyte sedimentation rate auto     Status: None   Result Value Ref Range    Sed Rate 7 0 - 20 mm/h   CRP inflammation     Status: None   Result Value Ref Range    CRP Inflammation <2.9 0.0 - 8.0 mg/L   HCG qualitative urine     Status: None   Result Value Ref Range    HCG Qual Urine Negative NEG^Negative         Abdominal x-ray: No obstruction, no free air,  No dilated bowel,  Right side with increased  amount of stool   x-ray read by me Maritza Lane MD      ASSESSMENT:  Right flank pain     - Comprehensive metabolic panel  - Lipase  - XR Abdomen 2 Views  - CBC with platelets differential  - UA reflex to Microscopic and Culture  - Erythrocyte sedimentation rate auto  - CRP inflammation  - HCG qualitative urine       We discussed some of the many possible causes of abdominal pain including appendicitis, cholecystitis, diverticulitis, hepatitis, colitis, gastritis,  A stone in the gallbladder, pancreas, kidney or ureter, constipation, obstructed bowel, kidney or bladder infection, cancers, UTI, , ectopic pregnancy,      Discussed that the lab results did not absolutely identify the cause of the abdominal pain, but her x- ray shows increased retained stool on the right  The testing has also confirmed that  He/she does not have some important causes of pain  Labs indicate unlikely urinary tract/ kidney infection or stone,  There is unlikely significant intraabdominal infection like appendicitis, cholecystitis or diverticulitis,  no bowel obstruction,  Testing for inflammation of liver, gallbladder and pancreas is normal.          Patient was counselled that if the abdominal  symptoms worsen, especially with worsening pain, associated fever, chills, and/or vomiting with inability to keep down foods and liquids, blood in the urine, stool or vomitus  that they should be re-evaluated in the Emergency Department.          Slow transit constipation    She should increase her prunes to daily-  And may use Miralax to do a clearing of her stool prior to her maintenance plan with the prunes.

## 2020-05-11 NOTE — PATIENT INSTRUCTIONS
Patient Education     Unknown Causes of Abdominal Pain (Female)    The exact cause of your belly (abdominal) pain is not clear. This does not mean that this is something to worry about. Everyone likes to know the exact cause of the problem. But sometimes with belly pain, there is no clear-cut cause, and this could be a good thing. The good news is that your symptoms can be treated, and you will feel better.   Your condition does not seem serious now. But sometimes the signs of a serious problem may take more time to appear. For this reason, it is important for you to watch for any new symptoms, problems, or worsening of your condition.  Over the next few days, the abdominal pain may come and go. Or it may be constant. Other common symptoms can include nausea and vomiting. Sometimes it can be difficult to tell if you feel nauseous. You may just feel bad and not connect that feeling to nausea. Constipation, diarrhea, and a fever may go along with the pain.  The pain may continue even if treated correctly over the following days. Depending on how things go, sometimes the cause can become clear and may need more or different treatment. Additional evaluations, medicines, or tests may also be needed.  Home care  Your healthcare provider may prescribe medicine for pain, symptoms, or an infection.  Follow the healthcare provider's instructions for taking these medicines.  General care    Rest as much as you can until your next exam. No strenuous activities.    Try to find positions that ease discomfort. A small pillow placed on the abdomen may help relieve pain.    Something warm on your abdomen (such as a heating pad) may help, but be careful not to burn yourself.  Diet    Don t force yourself to eat, especially if having cramps, vomiting, or diarrhea.    Water is important so you don't get dehydrated. Soup may also be good. Sports drinks may also help, especially if they are not too acidic. Don't drink sugary drinks as  this can make things worse. Take liquids in small amounts. Don t guzzle them.    Caffeine sometimes makes the pain and cramping worse.    Don t take dairy products if you have vomiting or diarrhea.    Don't eat large amounts at a time. Wait a few minutes between bites.    Eat a diet low in fiber (called a low-residue diet). Foods allowed include refined breads, white rice, fruit and vegetable juices without pulp, tender meats. These foods will pass more easily through the intestine.    Don t have whole-grain foods, whole fruits and vegetables, meats, seeds and nuts, fried or fatty foods, dairy, alcohol and spicy foods until your symptoms go away.  Follow-up care  Follow up with your healthcare provider, or as advised, if your pain does not begin to improve in the next 24 hours.  Call 911  Call 911 if any of these occur:    Trouble breathing    Confusion    Fainting or loss of consciousness    Rapid heart rate    Seizure  When to seek medical advice  Call your healthcare provider right away if any of these occur:    Pain gets worse or moves to the right lower abdomen    New or worsening vomiting or diarrhea    Swelling of the abdomen    Unable to pass stool for more than 3 days    Fever of 100.4 F (38 C) or higher, or as directed by your healthcare provider.    Blood in vomit or bowel movements (dark red or black color)    Yellow color of eyes and skin (jaundice)    Weakness, dizziness    Chest, arm, back, neck, or jaw pain    Unexpected vaginal bleeding or missed period    Can't keep down liquids or water and you are getting dehydrated  Date Last Reviewed: 6/1/2018 2000-2019 [employer]. [address] [place] [address], [place] [address]. All rights reserved. This information is not intended as a substitute for professional medical care. Always follow your healthcare professional's instructions.

## 2020-05-16 DIAGNOSIS — F33.0 MILD RECURRENT MAJOR DEPRESSION (H): ICD-10-CM

## 2020-05-16 DIAGNOSIS — F41.1 GAD (GENERALIZED ANXIETY DISORDER): ICD-10-CM

## 2020-05-19 RX ORDER — CITALOPRAM HYDROBROMIDE 20 MG/1
TABLET ORAL
Qty: 30 TABLET | Refills: 0 | Status: SHIPPED | OUTPATIENT
Start: 2020-05-19 | End: 2020-06-08

## 2020-05-19 NOTE — TELEPHONE ENCOUNTER
Pt due for phone/video follow up for depression/anxiety.  Please schedule within next month.  30 days sent to pharmacy.      Brynn Trinidad MBA, MS, PA-C

## 2020-05-19 NOTE — TELEPHONE ENCOUNTER
Citalopram 20mg      Last Written Prescription Date:  3/24/20  Last Fill Quantity: 30,   # refills: 1  Last Office Visit: 3/24/20  Future Office visit:  None scheduled    Routing refill request to provider for review/approval because:  PHQ-9 not at goal <5     PHQ 8/27/2019 10/10/2019 3/23/2020   PHQ-9 Total Score 4 7 14   Q9: Thoughts of better off dead/self-harm past 2 weeks Not at all Not at all Not at all        Kate Lyon, PharmD, BCACP  Medication Therapy Management Provider  Pager: [phone]

## 2020-06-08 ENCOUNTER — VIRTUAL VISIT (OUTPATIENT)
Dept: FAMILY MEDICINE | Facility: CLINIC | Age: 45
End: 2020-06-08
Payer: COMMERCIAL

## 2020-06-08 DIAGNOSIS — Z12.31 ENCOUNTER FOR SCREENING MAMMOGRAM FOR BREAST CANCER: ICD-10-CM

## 2020-06-08 DIAGNOSIS — F41.1 GAD (GENERALIZED ANXIETY DISORDER): Primary | ICD-10-CM

## 2020-06-08 DIAGNOSIS — Z98.84 BARIATRIC SURGERY STATUS: ICD-10-CM

## 2020-06-08 DIAGNOSIS — Z13.29 SCREENING FOR THYROID DISORDER: ICD-10-CM

## 2020-06-08 DIAGNOSIS — E55.9 VITAMIN D DEFICIENCY: ICD-10-CM

## 2020-06-08 DIAGNOSIS — E53.8 VITAMIN B 12 DEFICIENCY: ICD-10-CM

## 2020-06-08 DIAGNOSIS — Z13.220 SCREENING FOR HYPERLIPIDEMIA: ICD-10-CM

## 2020-06-08 DIAGNOSIS — F33.0 MILD RECURRENT MAJOR DEPRESSION (H): ICD-10-CM

## 2020-06-08 PROCEDURE — 96127 BRIEF EMOTIONAL/BEHAV ASSMT: CPT | Mod: TEL | Performed by: NURSE PRACTITIONER

## 2020-06-08 PROCEDURE — 99214 OFFICE O/P EST MOD 30 MIN: CPT | Mod: TEL | Performed by: NURSE PRACTITIONER

## 2020-06-08 RX ORDER — LORAZEPAM 0.5 MG/1
0.5 TABLET ORAL DAILY PRN
Qty: 20 TABLET | Refills: 0 | Status: SHIPPED | OUTPATIENT
Start: 2020-06-08 | End: 2020-08-07

## 2020-06-08 RX ORDER — CITALOPRAM HYDROBROMIDE 20 MG/1
20 TABLET ORAL DAILY
Qty: 90 TABLET | Refills: 1 | Status: SHIPPED | OUTPATIENT
Start: 2020-06-08 | End: 2020-11-30

## 2020-06-08 RX ORDER — BUPROPION HYDROCHLORIDE 150 MG/1
TABLET, EXTENDED RELEASE ORAL
Qty: 180 TABLET | Refills: 1 | Status: SHIPPED | OUTPATIENT
Start: 2020-06-08 | End: 2021-01-06

## 2020-06-08 ASSESSMENT — ANXIETY QUESTIONNAIRES
IF YOU CHECKED OFF ANY PROBLEMS ON THIS QUESTIONNAIRE, HOW DIFFICULT HAVE THESE PROBLEMS MADE IT FOR YOU TO DO YOUR WORK, TAKE CARE OF THINGS AT HOME, OR GET ALONG WITH OTHER PEOPLE: SOMEWHAT DIFFICULT
1. FEELING NERVOUS, ANXIOUS, OR ON EDGE: NEARLY EVERY DAY
6. BECOMING EASILY ANNOYED OR IRRITABLE: SEVERAL DAYS
2. NOT BEING ABLE TO STOP OR CONTROL WORRYING: SEVERAL DAYS
GAD7 TOTAL SCORE: 8
7. FEELING AFRAID AS IF SOMETHING AWFUL MIGHT HAPPEN: SEVERAL DAYS
3. WORRYING TOO MUCH ABOUT DIFFERENT THINGS: SEVERAL DAYS
5. BEING SO RESTLESS THAT IT IS HARD TO SIT STILL: NOT AT ALL

## 2020-06-08 ASSESSMENT — PATIENT HEALTH QUESTIONNAIRE - PHQ9
5. POOR APPETITE OR OVEREATING: SEVERAL DAYS
SUM OF ALL RESPONSES TO PHQ QUESTIONS 1-9: 3

## 2020-06-08 NOTE — PROGRESS NOTES
"   SUBJECTIVE:     The patient has been notified of following:     \"This telephone visit will be conducted via a call between you and your physician/provider. We have found that certain health care needs can be provided without the need for a physical exam.  This service lets us provide the care you need with a short phone conversation.  If a prescription is necessary we can send it directly to your pharmacy.  If lab work is needed we can place an order for that and you can then stop by our lab to have the test done at a later time.    Telephone visits are billed at different rates depending on your insurance coverage. During this emergency period, for some insurers they may be billed the same as an in-person visit.  Please reach out to your insurance provider with any questions.    If during the course of the call the physician/provider feels a telephone visit is not appropriate, you will not be charged for this service.\"    Patient has given verbal consent for Telephone visit?  Yes    What phone number would you like to be contacted at? [phone]    How would you like to obtain your AVS? Kadeem Lambert is a 45 year old female who is being evaluated via a billable telephone visit.  She is overdue for wellness exam, labs and mammo but we are unable to complete a wellness exam via telephone.     Depression and Anxiety Follow-Up    How are you doing with your depression since your last visit? Stable to slightly improved    How are you doing with your anxiety since your last visit?  Same as before - no change    Are you having other symptoms that might be associated with depression or anxiety? No    Have you had a significant life event? OTHER: lost job - is a positive, hard due to being there for 7 years and has not heard from anyone there     Do you have any concerns with your use of alcohol or other drugs? No     Job loss, job has been \"horrible for my mental health\" so losing it has been positive. "       PHQ 10/10/2019 3/23/2020 2020   PHQ-9 Total Score 7 14 3   Q9: Thoughts of better off dead/self-harm past 2 weeks Not at all Not at all Not at all     SHIRA-7 SCORE 10/10/2019 3/23/2020 2020   Total Score - 18 (severe anxiety) -   Total Score 20 18 8         How many servings of fruits and vegetables do you eat daily?  2-3    On average, how many sweetened beverages do you drink each day (Examples: soda, juice, sweet tea, etc.  Do NOT count diet or artificially sweetened beverages)?   0    How many days per week do you exercise enough to make your heart beat faster? 5-7 days - around neighborhood    How many minutes a day do you exercise enough to make your heart beat faster? 10 - 19    How many days per week do you miss taking your medication? 0      Patient Active Problem List   Diagnosis     Galactorrhea not associated with childbirth     Vitamin B 12 deficiency     Bariatric surgery status     Vitamin D deficiency     Morbid obesity (H)     Mild recurrent major depression (H)     Depression, unspecified depression type     ASCUS of cervix with negative high risk HPV     Gastric polyp - noted on  EGD     Hiatal hernia     Gastroesophageal reflux disease, esophagitis presence not specified     Past Surgical History:   Procedure Laterality Date     ESOPHAGOSCOPY, GASTROSCOPY, DUODENOSCOPY (EGD), COMBINED  2019    Dr. Pola GONZALEZ     ESOPHAGOSCOPY, GASTROSCOPY, DUODENOSCOPY (EGD), COMBINED N/A 2019    Procedure: ESOPHAGOGASTRODUODENOSCOPY, WITH BIOPSY POSSIBLE POLYP;  Surgeon: Yajaira Castellano MD;  Location:  GI     GASTRIC BYPASS  2004    at [facility]       Social History     Tobacco Use     Smoking status: Former Smoker     Packs/day: 0.00     Years: 0.00     Pack years: 0.00     Last attempt to quit: 2013     Years since quittin.8     Smokeless tobacco: Never Used   Substance Use Topics     Alcohol use: Yes     Comment: occasional - social     Family History   Problem Relation  Age of Onset     Lung Cancer Father 48        smoker - metastatic     Alcoholism Father         alcohol     Hypertension Mother         controlled - medication     Coronary Artery Disease Mother         recently diagnosed(64) - stent placed.  genetic     Obesity Mother         Gastric sleeve  9/2017     Brain Cancer Maternal Grandfather         brain     Asthma Paternal Grandmother      Asthma Paternal Grandfather      Coronary Artery Disease Paternal Grandfather      Heart Failure Paternal Grandfather          Current Outpatient Medications   Medication Sig Dispense Refill     buPROPion (WELLBUTRIN SR) 150 MG 12 hr tablet 1 tab twice a day 180 tablet 0     Cholecalciferol (VITAMIN D) 2000 UNITS tablet Take 1 tablet by mouth 3 times daily  100 tablet 3     citalopram (CELEXA) 20 MG tablet TAKE ONE TABLET BY MOUTH EVERY DAY 30 tablet 0     ferrous sulfate (FEROSUL) 325 (65 Fe) MG tablet Take 1 tablet (325 mg) by mouth daily (with breakfast) 90 tablet 0     loratadine (CLARITIN REDITABS) 10 MG dispersible tablet Take 10 mg by mouth daily       LORazepam (ATIVAN) 0.5 MG tablet Take 1 tablet (0.5 mg) by mouth daily as needed for anxiety Not intended for daily use 20 tablet 0     multivitamin, therapeutic with minerals (MULTI-VITAMIN) TABS Take 1 tablet by mouth daily       Omega-3 Fatty Acids (OMEGA-3 FISH OIL PO)        OMEPRAZOLE PO Take 20 mg by mouth       hydrOXYzine (ATARAX) 25 MG tablet          Reviewed and updated as needed this visit by Provider  Tobacco  Allergies  Meds  Problems  Med Hx  Surg Hx  Fam Hx         Review of Systems   Constitutional, HEENT, cardiovascular, pulmonary, GI, , musculoskeletal, neuro, skin, endocrine and psych systems are negative, except as otherwise noted in the HPI.      Objective   Reported vitals:  There were no vitals taken for this visit.   healthy, alert and no distress  PSYCH: Alert and oriented times 3; coherent speech, normal   rate and volume, able to articulate  logical thoughts, able   to abstract reason, no tangential thoughts, no hallucinations   or delusions  Her affect is normal and pleasant  RESP: No cough, no audible wheezing, able to talk in full sentences  Remainder of exam unable to be completed due to telephone visits      Assessment/Plan:  2. SHIAR (generalized anxiety disorder)  No concerns.  Stable.  Continue same medication this was refilled today.  - LORazepam (ATIVAN) 0.5 MG tablet; Take 1 tablet (0.5 mg) by mouth daily as needed for anxiety Not intended for daily use  Dispense: 20 tablet; Refill: 0  - buPROPion (WELLBUTRIN SR) 150 MG 12 hr tablet; 1 tab twice a day  Dispense: 180 tablet; Refill: 1  - citalopram (CELEXA) 20 MG tablet; Take 1 tablet (20 mg) by mouth daily  Dispense: 90 tablet; Refill: 1    3. Mild recurrent major depression (H)  No concerns.  Stable.  Continue same medication this was refilled today.  - LORazepam (ATIVAN) 0.5 MG tablet; Take 1 tablet (0.5 mg) by mouth daily as needed for anxiety Not intended for daily use  Dispense: 20 tablet; Refill: 0  - buPROPion (WELLBUTRIN SR) 150 MG 12 hr tablet; 1 tab twice a day  Dispense: 180 tablet; Refill: 1  - citalopram (CELEXA) 20 MG tablet; Take 1 tablet (20 mg) by mouth daily  Dispense: 90 tablet; Refill: 1    4. Screening for hyperlipidemia   Lipid panel reflex to direct LDL Fasting; Future    5. Screening for thyroid disorder  - **TSH with free T4 reflex FUTURE anytime; Future    6. Encounter for screening mammogram for breast cancer  Mammo overdue-will complete this.   - *MA Screening Digital Bilateral; Future    7. Vitamin D deficiency  - Vitamin D Deficiency; Future    8. Bariatric surgery status  - Vitamin B12; Future    9. Vitamin B 12 deficiency  - Vitamin B12; Future    Lab and mammo now; follow up wellness exam in 2 to 3 months.     Phone call duration: 14 Minutes 45 seconds      KHALIDA [facility] [place]

## 2020-06-08 NOTE — PROGRESS NOTES
"Elle Lambert is a 45 year old female who is being evaluated via a billable telephone visit.      The patient has been notified of following:     \"This telephone visit will be conducted via a call between you and your physician/provider. We have found that certain health care needs can be provided without the need for a physical exam.  This service lets us provide the care you need with a short phone conversation.  If a prescription is necessary we can send it directly to your pharmacy.  If lab work is needed we can place an order for that and you can then stop by our lab to have the test done at a later time.    Telephone visits are billed at different rates depending on your insurance coverage. During this emergency period, for some insurers they may be billed the same as an in-person visit.  Please reach out to your insurance provider with any questions.    If during the course of the call the physician/provider feels a telephone visit is not appropriate, you will not be charged for this service.\"    Patient has given verbal consent for Telephone visit?  Yes    What phone number would you like to be contacted at? [phone]    How would you like to obtain your AVS? MyChart    Subjective     Elle Lambert is a 45 year old female who presents via phone visit today for the following health issues:    HPI  Depression and Anxiety Follow-Up    How are you doing with your depression since your last visit? Stable to slightly improved    How are you doing with your anxiety since your last visit?  Same as before - no change    Are you having other symptoms that might be associated with depression or anxiety? No    Have you had a significant life event? OTHER: lost job - is a positive, hard due to being there for 7 years and has not heard from anyone there     Do you have any concerns with your use of alcohol or other drugs? No     Job loss, job has been \"horrible for my mental health\" so losing it has been positive. "     Social History     Tobacco Use     Smoking status: Former Smoker     Packs/day: 0.00     Years: 0.00     Pack years: 0.00     Last attempt to quit: 2013     Years since quittin.8     Smokeless tobacco: Never Used   Substance Use Topics     Alcohol use: Yes     Comment: occasional - social     Drug use: Yes     Types: Marijuana     PHQ 10/10/2019 3/23/2020 2020   PHQ-9 Total Score 7 14 3   Q9: Thoughts of better off dead/self-harm past 2 weeks Not at all Not at all Not at all     SHIRA-7 SCORE 10/10/2019 3/23/2020 2020   Total Score - 18 (severe anxiety) -   Total Score 20 18 8         How many servings of fruits and vegetables do you eat daily?  2-3    On average, how many sweetened beverages do you drink each day (Examples: soda, juice, sweet tea, etc.  Do NOT count diet or artificially sweetened beverages)?   0    How many days per week do you exercise enough to make your heart beat faster? 5-7 days - around neighborhood    How many minutes a day do you exercise enough to make your heart beat faster? 10 - 19    How many days per week do you miss taking your medication? 0      Patient Active Problem List   Diagnosis     Galactorrhea not associated with childbirth     Vitamin B 12 deficiency     Bariatric surgery status     Vitamin D deficiency     Morbid obesity (H)     Mild recurrent major depression (H)     Depression, unspecified depression type     ASCUS of cervix with negative high risk HPV     Gastric polyp - noted on  EGD     Hiatal hernia     Gastroesophageal reflux disease, esophagitis presence not specified     Past Surgical History:   Procedure Laterality Date     ESOPHAGOSCOPY, GASTROSCOPY, DUODENOSCOPY (EGD), COMBINED  2019    Dr. Escalona [facility]     ESOPHAGOSCOPY, GASTROSCOPY, DUODENOSCOPY (EGD), COMBINED N/A 2019    Procedure: ESOPHAGOGASTRODUODENOSCOPY, WITH BIOPSY POSSIBLE POLYP;  Surgeon: Yajaira Castellano MD;  Location:  GI     GASTRIC BYPASS      at [facility]        Social History     Tobacco Use     Smoking status: Former Smoker     Packs/day: 0.00     Years: 0.00     Pack years: 0.00     Last attempt to quit: 2013     Years since quittin.8     Smokeless tobacco: Never Used   Substance Use Topics     Alcohol use: Yes     Comment: occasional - social     Family History   Problem Relation Age of Onset     Lung Cancer Father 48        smoker - metastatic     Alcoholism Father         alcohol     Hypertension Mother         controlled - medication     Coronary Artery Disease Mother         recently diagnosed(64) - stent placed.  genetic     Obesity Mother         Gastric sleeve  2017     Brain Cancer Maternal Grandfather         brain     Asthma Paternal Grandmother      Asthma Paternal Grandfather      Coronary Artery Disease Paternal Grandfather      Heart Failure Paternal Grandfather          Current Outpatient Medications   Medication Sig Dispense Refill     buPROPion (WELLBUTRIN SR) 150 MG 12 hr tablet 1 tab twice a day 180 tablet 0     Cholecalciferol (VITAMIN D) 2000 UNITS tablet Take 1 tablet by mouth 3 times daily  100 tablet 3     citalopram (CELEXA) 20 MG tablet TAKE ONE TABLET BY MOUTH EVERY DAY 30 tablet 0     ferrous sulfate (FEROSUL) 325 (65 Fe) MG tablet Take 1 tablet (325 mg) by mouth daily (with breakfast) 90 tablet 0     loratadine (CLARITIN REDITABS) 10 MG dispersible tablet Take 10 mg by mouth daily       LORazepam (ATIVAN) 0.5 MG tablet Take 1 tablet (0.5 mg) by mouth daily as needed for anxiety Not intended for daily use 20 tablet 0     multivitamin, therapeutic with minerals (MULTI-VITAMIN) TABS Take 1 tablet by mouth daily       Omega-3 Fatty Acids (OMEGA-3 FISH OIL PO)        OMEPRAZOLE PO Take 20 mg by mouth       hydrOXYzine (ATARAX) 25 MG tablet          Reviewed and updated as needed this visit by Provider  Tobacco  Allergies  Meds  Problems  Med Hx  Surg Hx  Fam Hx         Review of Systems   Constitutional, HEENT,  cardiovascular, pulmonary, GI, , musculoskeletal, neuro, skin, endocrine and psych systems are negative, except as otherwise noted in the HPI.      Objective   Reported vitals:  There were no vitals taken for this visit.   healthy, alert and no distress  PSYCH: Alert and oriented times 3; coherent speech, normal   rate and volume, able to articulate logical thoughts, able   to abstract reason, no tangential thoughts, no hallucinations   or delusions  Her affect is normal and pleasant  RESP: No cough, no audible wheezing, able to talk in full sentences  Remainder of exam unable to be completed due to telephone visits      Assessment/Plan:  {Diagnosis, Associated Orders and Comment:309675}    No follow-ups on file.      Phone call duration: 14 Minutes 45 seconds      Kimi Santana, TEAGANP-BC

## 2020-06-09 ASSESSMENT — ANXIETY QUESTIONNAIRES: GAD7 TOTAL SCORE: 8

## 2020-06-09 NOTE — PROGRESS NOTES
I looked into billing her visit as a physical and it is not possible as I had hoped with a telephone visit. Please call patient and let her know I think it is still a good idea to proceed with her labs and mammo that are over due and we can set her up for an inperson wellness physical later in August with Brynn-please help her set this up.         Kimi Santana, TEAGANP-BC

## 2020-06-10 NOTE — PROGRESS NOTES
Pt advised of below. Pt will keep mammo appt but wanted to cancel lab until talking to insurance to find out about coverage, Pt stated she is out of a job right now and cannot afford unnecessary procedures.     Pt had no further questions at this time.     Colin Rodriguez RN   [facility]

## 2020-06-30 ENCOUNTER — HOSPITAL ENCOUNTER (OUTPATIENT)
Dept: MAMMOGRAPHY | Facility: CLINIC | Age: 45
Discharge: HOME | End: 2020-06-30
Attending: PHYSICIAN ASSISTANT | Admitting: PHYSICIAN ASSISTANT
Payer: COMMERCIAL

## 2020-06-30 DIAGNOSIS — Z12.31 ENCOUNTER FOR SCREENING MAMMOGRAM FOR BREAST CANCER: ICD-10-CM

## 2020-06-30 PROCEDURE — 77063 BREAST TOMOSYNTHESIS BI: CPT

## 2020-08-01 ENCOUNTER — VIRTUAL VISIT (OUTPATIENT)
Dept: FAMILY MEDICINE | Facility: OTHER | Age: 45
End: 2020-08-01

## 2020-08-01 NOTE — PROGRESS NOTES
"Date: 2020 13:59:44  Clinician: Dominic Laureano  Clinician NPI: [id number]  Patient: Elle Lambert  Patient : 1975  Patient Address: [address] [place] [address]  Patient Phone: [phone]  Visit Protocol: URI  Patient Summary:  Elle is a 45 year old ( : 1975 ) female who initiated a Visit for COVID-19 (Coronavirus) evaluation and screening. When asked the question \"Please sign me up to receive news, health information and promotions. \", Elle responded \"No\".    Elle states her symptoms started suddenly 3-4 days ago.   Her symptoms consist of ageusia, myalgia, anosmia, a cough, nasal congestion, malaise, and a headache. She is experiencing difficulty breathing due to nasal congestion but she is not short of breath.   Symptom details     Nasal secretions: The color of her mucus is clear and white.    Cough: Elle coughs a few times an hour and her cough is not more bothersome at night. Phlegm comes into her throat when she coughs. She does not believe her cough is caused by post-nasal drip. The color of the phlegm is clear and white.     Headache: She states the headache is mild (1-3 on a 10 point pain scale).      Elle denies having wheezing, nausea, teeth pain, diarrhea, sore throat, facial pain or pressure, fever, vomiting, rhinitis, ear pain, chills, and enlarged lymph nodes. She also denies having recent facial or sinus surgery in the past 60 days, double sickening (worsening symptoms after initial improvement), and taking antibiotic medication in the past month.   Precipitating events  She has not recently been exposed to someone with influenza. Elle has not been in close contact with any high risk individuals.   Pertinent COVID-19 (Coronavirus) information  In the past 14 days, Elle has not worked in a congregate living setting.   She does not work or volunteer as healthcare worker or a  and does not work or volunteer in a healthcare facility.   Elle also has " not lived in a congregate living setting in the past 14 days. She does not live with a healthcare worker.   Elle has had a close contact with a laboratory-confirmed COVID-19 patient within 14 days of symptom onset. Additional information about contact with COVID-19 (Coronavirus) patient as reported by the patient (free text): Tessy Rodriguez, Wednesday, July 22nd, [facility]   Pertinent medical history  Elle does not get yeast infections when she takes antibiotics.   Elle does not need a return to work/school note.   Weight: 204 lbs   Elle does not smoke or use smokeless tobacco.   She denies pregnancy and denies breastfeeding. She has menstruated in the past month.   Weight: 204 lbs    MEDICATIONS: Ativan oral, Omega 3-6-9 oral, Wellbutrin SR oral, omeprazole oral, Women's Multivitamin oral, Vitamin D3 oral, Iron (ferrous sulfate) oral, Claritin oral, Celexa oral, ALLERGIES: Ceftin, NSAIDS (Non-Steroidal Anti-Inflammatory Drug)  Clinician Response:  Dear Elle,   Your symptoms show that you may have coronavirus (COVID-19). This illness can cause fever, cough and trouble breathing. Many people get a mild case and get better on their own. Some people can get very sick.  What should I do?  We would like to test you for this virus.   1. Please call [phone] to schedule your visit. Explain that [facility] COVID-19 test. Be [facility].  The following will serve as your written order for this COVID Test, ordered by me, for the indication of suspected COVID [Z20.828]: The test will be ordered in [facility], our electronic health record, after you are scheduled. It will show as ordered and authorized by Mike An MD.  Order: COVID-19 (Coronavirus) PCR for SYMPTOMATIC testing from [facility].      2. When it's time for your COVID test:  Stay at least 6 feet away from others. (If someone will drive you to your test, stay in the backseat, as far away from the  " as you can.)   Cover your mouth and nose with a mask, tissue or washcloth.  Go straight to the testing site. Don't make any stops on the way there or back.      3.Starting now: Stay home and away from others (self-isolate) until:   You've had no fever---and no medicine that reduces fever---for 3 full days (72 hours). And...   Your other symptoms have gotten better. For example, your cough or breathing has improved. And...   At least 10 days have passed since your symptoms started.       During this time, don't leave the house except for testing or medical care.   Stay in your own room, even for meals. Use your own bathroom if you can.   Stay away from others in your home. No hugging, kissing or shaking hands. No visitors.  Don't go to work, school or anywhere else.    Clean \"high touch\" surfaces often (doorknobs, counters, handles, etc.). Use a household cleaning spray or wipes. You'll find a full list of  on the EPA website: www.epa.gov/pesticide-registration/list-n-disinfectants-use-against-sars-cov-2.   Cover your mouth and nose with a mask, tissue or washcloth to avoid spreading germs.  Wash your hands and face often. Use soap and water.  Caregivers in these groups are at risk for severe illness due to COVID-19:  o People 65 years and older  o People who live in a nursing home or long-term care facility  o People with chronic disease (lung, heart, cancer, diabetes, kidney, liver, immunologic)  o People who have a weakened immune system, including those who:   Are in cancer treatment  Take medicine that weakens the immune system, such as corticosteroids  Had a bone marrow or organ transplant  Have an immune deficiency  Have poorly controlled HIV or AIDS  Are obese (body mass index of 40 or higher)  Smoke regularly   o Caregivers should wear gloves while washing dishes, handling laundry and cleaning bedrooms and bathrooms.  o Use caution when washing and drying laundry: Don't shake dirty laundry, " and use the warmest water setting that you can.  o For more tips, go to www.cdc.gov/coronavirus/2019-ncov/downloads/10Things.pdf.    4.Sign up for [employer]. We know it's scary to hear that you might have COVID-19. We want to track your symptoms to make sure you're okay over the next 2 weeks. Please look for an email from [email or web address]---this is a free, online program that we'll use to keep in touch. To sign up, follow the link in the email. Learn more at [email or web address]  How can I take care of myself?   Get lots of rest. Drink extra fluids (unless a doctor has told you not to).   Take Tylenol (acetaminophen) for fever or pain. If you have liver or kidney problems, ask your family doctor if it's okay to take Tylenol.   Adults can take either:    650 mg (two 325 mg pills) every 4 to 6 hours, or...   1,000 mg (two 500 mg pills) every 8 hours as needed.    Note: Don't take more than 3,000 mg in one day. Acetaminophen is found in many medicines (both prescribed and over-the-counter medicines). Read all labels to be sure you don't take too much.   For children, check the Tylenol bottle for the right dose. The dose is based on the child's age or weight.    If you have other health problems (like cancer, heart failure, an organ transplant or severe kidney disease): Call your specialty clinic if you don't feel better in the next 2 days.       Know when to call 911. Emergency warning signs include:    Trouble breathing or shortness of breath Pain or pressure in the chest that doesn't go away Feeling confused like you haven't felt before, or not being able to wake up Bluish-colored lips or face.  Where can I get more information?    [facility] -- About COVID-19: [email or web address]   CDC -- What to Do If You're Sick: www.cdc.gov/coronavirus/2019-ncov/about/steps-when-sick.html   CDC -- Ending Home Isolation: www.cdc.gov/coronavirus/2019-ncov/hcp/disposition-in-home-patients.html   CDC --  Caring for Someone: www.cdc.gov/coronavirus/2019-ncov/if-you-are-sick/care-for-someone.html   [facility] -- Interim Guidance for Hospital Discharge to Home: www.health.[facility].mn.us/diseases/coronavirus/hcp/hospdischarge.pdf   [facility] clinical trials (COVID-19 research studies): clinicalaffairs.[facility]-clinical-trials    Below are the COVID-19 hotlines at the [place] Department of Health ([facility]). Interpreters are available.    For health questions: Call 431-379-5132 or 1-162.407.5771 (7 a.m. to 7 p.m.) For questions about schools and childcare: Call 209-350-1957 or 1-719.266.6858 (7 a.m. to 7 p.m.)    Diagnosis: Cough  Diagnosis ICD: R05

## 2020-08-03 DIAGNOSIS — Z20.822 COVID-19 RULED OUT: Primary | ICD-10-CM

## 2020-08-03 PROCEDURE — U0003 INFECTIOUS AGENT DETECTION BY NUCLEIC ACID (DNA OR RNA); SEVERE ACUTE RESPIRATORY SYNDROME CORONAVIRUS 2 (SARS-COV-2) (CORONAVIRUS DISEASE [COVID-19]), AMPLIFIED PROBE TECHNIQUE, MAKING USE OF HIGH THROUGHPUT TECHNOLOGIES AS DESCRIBED BY CMS-2020-01-R: HCPCS | Performed by: FAMILY MEDICINE

## 2020-08-04 ENCOUNTER — TELEPHONE (OUTPATIENT)
Dept: NURSING | Facility: CLINIC | Age: 45
End: 2020-08-04

## 2020-08-04 LAB
SARS-COV-2 RNA SPEC QL NAA+PROBE: ABNORMAL
SPECIMEN SOURCE: ABNORMAL

## 2020-08-05 ENCOUNTER — NURSE TRIAGE (OUTPATIENT)
Dept: NURSING | Facility: CLINIC | Age: 45
End: 2020-08-05

## 2020-08-05 NOTE — TELEPHONE ENCOUNTER
Coronavirus (COVID-19) Notification    Patient  Elle Lambert    Coronavirus (COVID-19) Notification     Reason for call  Notify of POSITIVE  COVID-19 lab result, assess symptoms,  review [facility] recommendations     Lab Result   Lab test for 2019-nCoV rRt-PCR or SARS-COV-2 PCR  Oropharyngeal AND/OR nasopharyngeal swabs were POSITIVE for 2019-nCoV RNA [OR] SARS-COV-2 RNA (COVID-19) RNA      We have been unable to reach Patient by phone at this time to notify of their Positive COVID-19 result.  Left voicemail message requesting a call back between 8 am to 6:30 p.m. to [phone] [facility] for results.   (Weekends, this line is available from 10A to 6:30P)     POSITIVE COVID-19 Letter Sent: NO     [Name]  Harshil Hernández RN  [facility]

## 2020-08-05 NOTE — TELEPHONE ENCOUNTER
"Coronavirus (COVID-19) Notification    Caller Name (Patient, parent, daughter/son, grandparent, etc)  Patient, Elle Lambert.    Reason for call  Notify of Positive Coronavirus (COVID-19) lab results, assess symptoms,  review  [facility] recommendations    Lab Result    Lab test:  2019-nCoV rRt-PCR or SARS-CoV-2 PCR    Oropharyngeal AND/OR nasopharyngeal swabs is POSITIVE for 2019-nCoV RNA/SARS-COV-2 PCR (COVID-19 virus)    RN Recommendations/Instructions per  [facility] Coronavirus COVID-19 recommendations    Brief introduction script  Introduce self then review script:  \"I am calling on behalf of [employer].  We were notified that your Coronavirus test (COVID-19) for was POSITIVE for the virus.  I have some information to relay to you but first I wanted to mention that the [place] Dept of Health will be contacting you shortly [it's possible MD already called Patient] to talk to you more about how you are feeling and other people you have had contact with who might now also have the virus.  Also, [facility] is Partnering with the [facility] for Covid-19 research, you may be contacted directly by research staff.\"    Assessment (Inquire about Patient's current symptoms)   Assessment   Current Symptoms at time of phone call: (if no symptoms, document No symptoms] Mild loss of taste and smell one week ago yesterday, 8 days now. No labored breathing, coughing. Productive now. Thinks she may have had an head cold too. Stuffiness.   Symptoms onset (if applicable) 8 days ago. Checking fever multiple times a day. Usually below 98.6.      If at time of call, Patients symptoms hare worsened, the Patient should contact 911 or have someone drive them to Emergency Dept promptly:      If Patient calling 911, inform 911 personal that you have tested positive for the Coronavirus (COVID-19).  Place mask on and await 911 to arrive.    If Emergency Dept, If possible, please have another adult drive you to the " Emergency Dept but you need to wear mask when in contact with other people.      Review information with Patient    Your result was positive. This means you have COVID-19 (coronavirus).  We have sent you a letter that reviews the information that I'll be reviewing with you now.    How can I protect others?    If you have symptoms: stay home and away from others (self-isolate) until:    You've had no fever--and no medicine that reduces fever--for 3 full days (72 hours). And      Your other symptoms have gotten better. For example, your cough or breathing has improved. And     At least 10 days have passed since your symptoms started.    If you don't have symptoms: Stay home and away from others (self-isolate) until at least 10 days have passed since your first positive COVID-19 test. (Date test collected)    During this time:    Stay in your own room, including for meals. Use your own bathroom if you can.    Stay away from others in your home. No hugging, kissing or shaking hands. No visitors.     Don't go to work, school or anywhere else.     Clean  high touch  surfaces often (doorknobs, counters, handles, etc.). Use a household cleaning spray or wipes. You'll find a full list on the EPA website at www.epa.gov/pesticide-registration/list-n-disinfectants-use-against-sars-cov-2.     Cover your mouth and nose with a mask, tissue or washcloth to avoid spreading germs.    Wash your hands and face often with soap and water.    Caregivers in these groups are at risk for severe illness due to COVID-19:  o People 65 years and older  o People who live in a nursing home or long-term care facility  o People with chronic disease (lung, heart, cancer, diabetes, kidney, liver, immunologic)  o People who have a weakened immune system, including those who:  - Are in cancer treatment  - Take medicine that weakens the immune system, such as corticosteroids  - Had a bone marrow or organ transplant  - Have an immune deficiency  - Have  poorly controlled HIV or AIDS  - Are obese (body mass index of 40 or higher)  - Smoke regularly    Caregivers should wear gloves while washing dishes, handling laundry and cleaning bedrooms and bathrooms.    Wash and dry laundry with special caution. Don't shake dirty laundry, and use the warmest water setting you can.    If you have a weakened immune system, ask your doctor about other actions you should take.    For more tips, go to www.cdc.gov/coronavirus/2019-ncov/downloads/10Things.pdf.    You should not go back to work until you meet the guidelines above for ending your home isolation. You should meet these along with any other guidelines that your employer has.    Employers: This document serves as formal notice of your employee's medical guidelines for going back to work. They must meet the above guidelines before going back to work in person.    How can I take care of myself?    1. Get lots of rest. Drink extra fluids (unless a doctor has told you not to).    2. Take Tylenol (acetaminophen) for fever or pain. If you have liver or kidney problems, ask your family doctor if it's okay to take Tylenol.     Take either:     650 mg (two 325 mg pills) every 4 to 6 hours, or     1,000 mg (two 500 mg pills) every 8 hours as needed.     Note: Don't take more than 3,000 mg in one day. Acetaminophen is found in many medicines (both prescribed and over-the-counter medicines). Read all labels to be sure you don't take too much.    For children, check the Tylenol bottle for the right dose (based on their age or weight).    3. If you have other health problems (like cancer, heart failure, an organ transplant or severe kidney disease): Call your specialty clinic if you don't feel better in the next 2 days.    4. Know when to call 911: Emergency warning signs include:    Trouble breathing or shortness of breath    Pain or pressure in the chest that doesn't go away    Feeling confused like you haven't felt before, or not  being able to wake up    Bluish-colored lips or face    5. Sign up for [employer]. We know it's scary to hear that you have COVID-19. We want to track your symptoms to make sure you're okay over the next 2 weeks. Please look for an email from [employer]--this is a free, online program that we'll use to keep in touch. To sign up, follow the link in the email. Learn more at [email or web address].    Where can I get more information?    [facility]: [email or web address]    Coronavirus Basics: www.health.[facility].[place]./diseases/coronavirus/basics.html    What to Do If You're Sick: www.cdc.gov/coronavirus/2019-ncov/about/steps-when-sick.html    Ending Home Isolation: www.cdc.gov/coronavirus/2019-ncov/hcp/disposition-in-home-patients.html     Caring for Someone with COVID-19: www.cdc.gov/coronavirus/2019-ncov/if-you-are-sick/care-for-someone.html     [facility] clinical trials (COVID-19 research studies): clinicalaffairs.[facility]-clinical-trials     A Positive COVID-19 letter will be sent via [employer] or the mail.  Her  will be tested on Friday. He is with a different health care system. He has been helping Elle. They are staying distanced as best they can and their child is with a grandparent and was there before Elle stated in with symptoms.  [Name]  Jessica Muhammad RN  [facility]       Additional Information    Negative: Nursing judgment    Negative: Nursing judgment    Negative: Nursing judgment    Negative: Nursing judgment    Information only question and nurse able to answer    Protocols used: NO PROTOCOL AVAILABLE - INFORMATION ONLY-A-[place]

## 2020-08-06 ENCOUNTER — MYC MEDICAL ADVICE (OUTPATIENT)
Dept: FAMILY MEDICINE | Facility: CLINIC | Age: 45
End: 2020-08-06

## 2020-08-06 DIAGNOSIS — F41.1 GAD (GENERALIZED ANXIETY DISORDER): ICD-10-CM

## 2020-08-06 DIAGNOSIS — F33.0 MILD RECURRENT MAJOR DEPRESSION (H): ICD-10-CM

## 2020-08-06 NOTE — TELEPHONE ENCOUNTER
Outpatient Medication Detail      Disp  Refills  Start  End  PAULINE    LORazepam (ATIVAN) 0.5 MG tablet  20 tablet  0  6/8/2020   No    Sig - Route: Take 1 tablet (0.5 mg) by mouth daily as needed for anxiety Not intended for daily use - Oral      Problem List Complete:  No     PROVIDER TO CONSIDER COMPLETION OF PROBLEM LIST AND OVERVIEW/CONTROLLED SUBSTANCE AGREEMENT    Last Office Visit with [facility] primary care provider: 6/8/20    Future Office visit:     Controlled substance agreement:   Encounter-Level CSA:    There are no encounter-level csa.     Patient-Level CSA:    There are no patient-level csa.       Last Urine Drug Screen: No results found for: CDAUT, No results found for: COMDAT, No results found for: THC13, PCP13, COC13, MAMP13, OPI13, AMP13, BZO13, TCA13, MTD13, BAR13, OXY13, PPX13, BUP13     [email or web address]        Routing refill request to provider for review/approval because:  Drug not on the [facility] refill protocol         Ekaterina Mejia RN  [facility]

## 2020-08-07 RX ORDER — LORAZEPAM 0.5 MG/1
0.5 TABLET ORAL DAILY PRN
Qty: 20 TABLET | Refills: 0 | Status: SHIPPED | OUTPATIENT
Start: 2020-08-07 | End: 2020-08-21

## 2020-08-07 NOTE — TELEPHONE ENCOUNTER
Physical scheduled in 2 weeks.  Pt positive covid test 7/29 - will call and cancel if not symptom-free for at least 3 days prior to this appt.

## 2020-08-07 NOTE — TELEPHONE ENCOUNTER
One time fill. This was sent to [facility] in [place].     Due for physical, labs mammo.     Also needs to sign CSA.    Please help patient set up in person physical appointment.       Kimi Santana, KHALIDA-BC

## 2020-08-20 ASSESSMENT — PATIENT HEALTH QUESTIONNAIRE - PHQ9: SUM OF ALL RESPONSES TO PHQ QUESTIONS 1-9: 10

## 2020-08-21 ENCOUNTER — OFFICE VISIT (OUTPATIENT)
Dept: FAMILY MEDICINE | Facility: CLINIC | Age: 45
End: 2020-08-21
Payer: COMMERCIAL

## 2020-08-21 ENCOUNTER — OFFICE VISIT (OUTPATIENT)
Dept: PEDIATRICS | Facility: CLINIC | Age: 45
End: 2020-08-21
Payer: COMMERCIAL

## 2020-08-21 ENCOUNTER — ANCILLARY PROCEDURE (OUTPATIENT)
Dept: GENERAL RADIOLOGY | Facility: CLINIC | Age: 45
End: 2020-08-21
Attending: NURSE PRACTITIONER
Payer: COMMERCIAL

## 2020-08-21 ENCOUNTER — HOSPITAL ENCOUNTER (OUTPATIENT)
Dept: CT IMAGING | Facility: CLINIC | Age: 45
Discharge: HOME | End: 2020-08-21
Attending: PREVENTIVE MEDICINE | Admitting: PREVENTIVE MEDICINE
Payer: COMMERCIAL

## 2020-08-21 VITALS
BODY MASS INDEX: 34.16 KG/M2 | OXYGEN SATURATION: 99 % | DIASTOLIC BLOOD PRESSURE: 85 MMHG | TEMPERATURE: 98 F | HEIGHT: 65 IN | WEIGHT: 205 LBS | SYSTOLIC BLOOD PRESSURE: 135 MMHG | HEART RATE: 77 BPM

## 2020-08-21 VITALS
SYSTOLIC BLOOD PRESSURE: 135 MMHG | BODY MASS INDEX: 34.11 KG/M2 | HEART RATE: 82 BPM | OXYGEN SATURATION: 96 % | DIASTOLIC BLOOD PRESSURE: 80 MMHG | RESPIRATION RATE: 16 BRPM | WEIGHT: 205 LBS | TEMPERATURE: 99.2 F

## 2020-08-21 DIAGNOSIS — U07.1 COVID-19 VIRUS DETECTED: ICD-10-CM

## 2020-08-21 DIAGNOSIS — F41.1 GAD (GENERALIZED ANXIETY DISORDER): ICD-10-CM

## 2020-08-21 DIAGNOSIS — R06.02 SHORTNESS OF BREATH: ICD-10-CM

## 2020-08-21 DIAGNOSIS — R53.83 FATIGUE, UNSPECIFIED TYPE: ICD-10-CM

## 2020-08-21 DIAGNOSIS — E55.9 VITAMIN D DEFICIENCY: ICD-10-CM

## 2020-08-21 DIAGNOSIS — R00.0 TACHYCARDIA: ICD-10-CM

## 2020-08-21 DIAGNOSIS — Z98.84 BARIATRIC SURGERY STATUS: ICD-10-CM

## 2020-08-21 DIAGNOSIS — R00.2 PALPITATIONS: ICD-10-CM

## 2020-08-21 DIAGNOSIS — J18.9 PNEUMONIA OF LEFT LOWER LOBE DUE TO INFECTIOUS ORGANISM: Primary | ICD-10-CM

## 2020-08-21 DIAGNOSIS — E53.8 VITAMIN B 12 DEFICIENCY: ICD-10-CM

## 2020-08-21 DIAGNOSIS — Z13.29 SCREENING FOR THYROID DISORDER: ICD-10-CM

## 2020-08-21 DIAGNOSIS — R00.0 TACHYCARDIA: Primary | ICD-10-CM

## 2020-08-21 LAB
ALBUMIN SERPL-MCNC: 3.8 G/DL (ref 3.4–5)
ALP SERPL-CCNC: 100 U/L (ref 40–150)
ALT SERPL W P-5'-P-CCNC: 22 U/L (ref 0–50)
ANION GAP SERPL CALCULATED.3IONS-SCNC: 7 MMOL/L (ref 3–14)
AST SERPL W P-5'-P-CCNC: 20 U/L (ref 0–45)
BASOPHILS # BLD AUTO: 0 10E9/L (ref 0–0.2)
BASOPHILS NFR BLD AUTO: 0.5 %
BILIRUB SERPL-MCNC: 1.1 MG/DL (ref 0.2–1.3)
BUN SERPL-MCNC: 8 MG/DL (ref 7–30)
CALCIUM SERPL-MCNC: 8.5 MG/DL (ref 8.5–10.1)
CHLORIDE SERPL-SCNC: 104 MMOL/L (ref 94–109)
CO2 SERPL-SCNC: 24 MMOL/L (ref 20–32)
CREAT SERPL-MCNC: 0.73 MG/DL (ref 0.52–1.04)
CRP SERPL-MCNC: <2.9 MG/L (ref 0–8)
D DIMER PPP FEU-MCNC: 0.4 UG/ML FEU (ref 0–0.5)
DIFFERENTIAL METHOD BLD: NORMAL
EOSINOPHIL # BLD AUTO: 0 10E9/L (ref 0–0.7)
EOSINOPHIL NFR BLD AUTO: 0.6 %
ERYTHROCYTE [DISTWIDTH] IN BLOOD BY AUTOMATED COUNT: 13.5 % (ref 10–15)
GFR SERPL CREATININE-BSD FRML MDRD: >90 ML/MIN/{1.73_M2}
GLUCOSE SERPL-MCNC: 92 MG/DL (ref 70–99)
HCG SERPL QL: NEGATIVE
HCT VFR BLD AUTO: 40.3 % (ref 35–47)
HGB BLD-MCNC: 13.4 G/DL (ref 11.7–15.7)
IMM GRANULOCYTES # BLD: 0 10E9/L (ref 0–0.4)
IMM GRANULOCYTES NFR BLD: 0.3 %
LIPASE SERPL-CCNC: 70 U/L (ref 73–393)
LYMPHOCYTES # BLD AUTO: 1.1 10E9/L (ref 0.8–5.3)
LYMPHOCYTES NFR BLD AUTO: 16.8 %
MCH RBC QN AUTO: 32.8 PG (ref 26.5–33)
MCHC RBC AUTO-ENTMCNC: 33.3 G/DL (ref 31.5–36.5)
MCV RBC AUTO: 99 FL (ref 78–100)
MONOCYTES # BLD AUTO: 0.7 10E9/L (ref 0–1.3)
MONOCYTES NFR BLD AUTO: 9.9 %
NEUTROPHILS # BLD AUTO: 4.7 10E9/L (ref 1.6–8.3)
NEUTROPHILS NFR BLD AUTO: 71.9 %
NRBC # BLD AUTO: 0 10*3/UL
NRBC BLD AUTO-RTO: 0 /100
PLATELET # BLD AUTO: 270 10E9/L (ref 150–450)
POTASSIUM SERPL-SCNC: 3.4 MMOL/L (ref 3.4–5.3)
PROT SERPL-MCNC: 7.6 G/DL (ref 6.8–8.8)
RBC # BLD AUTO: 4.09 10E12/L (ref 3.8–5.2)
SODIUM SERPL-SCNC: 135 MMOL/L (ref 133–144)
TROPONIN I SERPL-MCNC: <0.015 UG/L (ref 0–0.04)
TSH SERPL DL<=0.005 MIU/L-ACNC: 1.02 MU/L (ref 0.4–4)
VIT B12 SERPL-MCNC: 408 PG/ML (ref 193–986)
WBC # BLD AUTO: 6.6 10E9/L (ref 4–11)

## 2020-08-21 PROCEDURE — 80050 GENERAL HEALTH PANEL: CPT | Performed by: PREVENTIVE MEDICINE

## 2020-08-21 PROCEDURE — 84484 ASSAY OF TROPONIN QUANT: CPT | Performed by: PREVENTIVE MEDICINE

## 2020-08-21 PROCEDURE — 85379 FIBRIN DEGRADATION QUANT: CPT | Performed by: PREVENTIVE MEDICINE

## 2020-08-21 PROCEDURE — 71275 CT ANGIOGRAPHY CHEST: CPT

## 2020-08-21 PROCEDURE — 82306 VITAMIN D 25 HYDROXY: CPT | Performed by: PREVENTIVE MEDICINE

## 2020-08-21 PROCEDURE — 82607 VITAMIN B-12: CPT | Performed by: PREVENTIVE MEDICINE

## 2020-08-21 PROCEDURE — 83690 ASSAY OF LIPASE: CPT | Performed by: PREVENTIVE MEDICINE

## 2020-08-21 PROCEDURE — 93000 ELECTROCARDIOGRAM COMPLETE: CPT | Performed by: PREVENTIVE MEDICINE

## 2020-08-21 PROCEDURE — 25000128 H RX IP 250 OP 636: Performed by: PREVENTIVE MEDICINE

## 2020-08-21 PROCEDURE — 36415 COLL VENOUS BLD VENIPUNCTURE: CPT | Performed by: PREVENTIVE MEDICINE

## 2020-08-21 PROCEDURE — 86140 C-REACTIVE PROTEIN: CPT | Performed by: PREVENTIVE MEDICINE

## 2020-08-21 PROCEDURE — 84703 CHORIONIC GONADOTROPIN ASSAY: CPT | Performed by: PREVENTIVE MEDICINE

## 2020-08-21 PROCEDURE — 99215 OFFICE O/P EST HI 40 MIN: CPT | Performed by: PREVENTIVE MEDICINE

## 2020-08-21 PROCEDURE — 93000 ELECTROCARDIOGRAM COMPLETE: CPT | Performed by: NURSE PRACTITIONER

## 2020-08-21 PROCEDURE — 99207 ZZC FIRST ORDER ACUTE REFERRAL: CPT | Performed by: NURSE PRACTITIONER

## 2020-08-21 RX ORDER — DOXYCYCLINE 100 MG/1
100 TABLET ORAL 2 TIMES DAILY
Qty: 14 TABLET | Refills: 0 | Status: SHIPPED | OUTPATIENT
Start: 2020-08-21 | End: 2020-08-28

## 2020-08-21 RX ORDER — IOPAMIDOL 755 MG/ML
500 INJECTION, SOLUTION INTRAVASCULAR ONCE
Status: COMPLETED | OUTPATIENT
Start: 2020-08-21 | End: 2020-08-21

## 2020-08-21 RX ORDER — LORAZEPAM 0.5 MG/1
0.5 TABLET ORAL DAILY PRN
Qty: 20 TABLET | Refills: 0 | Status: SHIPPED | OUTPATIENT
Start: 2020-08-21 | End: 2021-06-29

## 2020-08-21 RX ADMIN — IOPAMIDOL 80 ML: 755 INJECTION, SOLUTION INTRAVENOUS at 13:31

## 2020-08-21 ASSESSMENT — MIFFLIN-ST. JEOR: SCORE: 1575.75

## 2020-08-21 ASSESSMENT — PATIENT HEALTH QUESTIONNAIRE - PHQ9
SUM OF ALL RESPONSES TO PHQ QUESTIONS 1-9: 10
10. IF YOU CHECKED OFF ANY PROBLEMS, HOW DIFFICULT HAVE THESE PROBLEMS MADE IT FOR YOU TO DO YOUR WORK, TAKE CARE OF THINGS AT HOME, OR GET ALONG WITH OTHER PEOPLE: VERY DIFFICULT

## 2020-08-21 NOTE — PROGRESS NOTES
"Subjective   Elle Lambert is a 45 year old female who presents to clinic today for the following health issues:    HPI     Heart rate was high, pt had Panic attack 08/06/2020.   Heart rate had increased since. Checks at home . Pulse is currently 88 in clinic.  Numbness in R 3rd,4th, and 5th finders - Left 4th and 5th - running into the hand on both.  Tightness on entire left side of body - starts when feels heart is going to race.  Does stretch a lot.   Only symptoms of Covid were loss of taste and smell-this has resolved.   Does have hiatal hernia - feels is pressing up again everything.  Does have anxiety has anxiety attacks but reports this is different and feels way worse.  She has been using her Ativan not great relief of symptoms; she is very concerned or something is wrong with her heart.  She has been having symptoms since 8-6-2020.  Of note positive COVID-19 on 8-3-2020.  She feels tachycardic palpitations with shortness of breath denies feeling of chest pain although the sensation takes her breath away.  She is easily winded with any exertion.  She denies any calf pain or discomfort.   Reviewed and updated as needed this visit by provider:  Tobacco  Allergies  Meds  Problems  Med Hx  Surg Hx  Fam Hx         Review of Systems   Constitutional, HEENT, cardiovascular, pulmonary, GI, , musculoskeletal, neuro, skin, endocrine and psych systems are negative, except as otherwise noted in the HPI.          Objective   /85 (BP Location: Right arm, Patient Position: Chair, Cuff Size: Adult Regular)   Pulse 77   Temp 98  F (36.7  C) (Oral)   Ht 1.651 m (5' 5\")   Wt 93 kg (205 lb)   LMP 08/03/2020 (Exact Date)   SpO2 99%   Breastfeeding No   BMI 34.11 kg/m   Body mass index is 34.11 kg/m .  Physical Exam   GENERAL: healthy, alert, well nourished, well hydrated, no distress  HENT: ear canals- normal; TMs- normal; Nose- normal; Mouth- no ulcers, no lesions  NECK: no tenderness, no " adenopathy, no asymmetry, no masses, no stiffness; thyroid- normal to palpation  RESP: lungs clear to auscultation - no rales, no rhonchi, no wheezes  CV: regular rates and rhythm, normal S1 S2, no S3 or S4 and no murmur, no click or rub -  ABDOMEN: soft, no tenderness, no  hepatosplenomegaly, no masses, normal bowel sounds  SKIN: no suspicious lesions, no rashes  PSYCH: Alert and oriented times 3; speech- coherent , normal rate and volume; able to articulate logical thoughts, able to abstract reason, no tangential thoughts, no hallucinations or delusions, affect- anxious/tearful.   LYMPHATICS: ant. cervical- normal, post. cervical- normal, axillary- normal, supraclavicular- normal, inguinal- normal    Diagnostic Test Results  EKG - appears normal, NSR, normal axis, normal intervals, no acute ST/T changes c/w ischemia, no LVH by voltage criteria, there are no prior tracings available    Transfer to Acute and Diagnostic Services  I have contacted the staff at the [facility] at [phone] to confirm patient acceptance.  Special Needs: None    Discussed transition to Acute & Diagnostic Services Clinic, and patient agrees with next level of care.  Patient transportation will be provided by the patient.        Assessment & Plan   Elle was seen today for physical and anxiety.    Diagnoses and all orders for this visit:    Tachycardia  Palpitations  Shortness of breath  Fatigue, unspecified type  COVID-19 virus detected  Known COVID + illness with SOB, tachycardia, increased anxiety. NEED to rule out PE.  Physical exam reassuring. Patient tearful and anxious and understandably very concerned something is wrong. Concerns validated.   Appropriate for [facility] today to evaluate. They have accepted. Address and number provided.   Will also do ZIO patch outpatient. Further cardiac workup declines at this time. Will complete ZIO and go from there with echo and stress test if needed.   Red flag  "symptoms discussed and if these occur present to the emergency room or call 911.  Elle verbalizes understanding of plan of care and is in agreement.   -     EKG 12-lead complete w/read - Clinics  -     Cancel: XR Chest 2 Views; Future  -     XR Chest 2 Views  -     **TSH with free T4 reflex FUTURE anytime    Vitamin B 12 deficiency  -     Vitamin B12    Vitamin D deficiency  -     Vitamin D Deficiency    SHIRA (generalized anxiety disorder)  Refill ativan to use sparingly. Need to consider increasing Celexa.     BMI:   Estimated body mass index is 35.3 kg/m  as calculated from the following:    Height as of 11/14/19: 1.651 m (5' 5\").    Weight as of 5/11/20: 96.2 kg (212 lb 1.6 oz).     See Patient Instructions    Return in about 1 week (around 8/28/2020) for Recheck.     Kimi Santana, TEAGANP-BC     [address] [address]  [email or web address]   Office: [phone]      "

## 2020-08-21 NOTE — PATIENT INSTRUCTIONS
Take doxycycline 100 mg two times per day for 7 days.  Have cardiac monitor done through the primary care clinic.  Follow up with pcp in 1-2 weeks

## 2020-08-21 NOTE — RESULT ENCOUNTER NOTE
Results discussed directly with patient while patient was present. Any further details documented in the note.   NORI Urbina CNP

## 2020-08-21 NOTE — PATIENT INSTRUCTIONS
"  Patient Education     Heart Palpitations    Palpitations are the feeling that your heart is beating hard, fast, or irregular. Some describe it as \"pounding,\" \"flip-flopping in the chest,\" or \"skipped beats.\" Palpitations may occur in someone with heart disease. But they can also occur in a healthy person.  Heart-related causes:    Heart rhythm problem (arrhythmia)    Heart valve disease    Disease of the heart muscle (cardiomyopathy)    Coronary artery disease    High blood pressure  Non-heart-related causes:    Certain medicines such as asthma inhalers and decongestants    Some herbal supplements, energy drinks and pills, and weight loss pills    Illegal stimulant drugs such as cocaine, crank, methamphetamine, PCP, bath salts, and ecstasy    Caffeine, alcohol, and tobacco    Health conditions such as thyroid disease, anemia, anxiety, and panic disorder  Sometimes the cause can't be found.  Home care  Follow these home care tips:    Don't use too much caffeine, alcohol, or tobacco, or any stimulant drugs.    Tell your doctor about any prescription or over-the-counter or herbal medicines you take.  Follow-up care    Follow up with your doctor, or as advised.    Call 911  This is the fastest and safest way to get to the emergency department. The paramedics can also start treatment on the way to the hospital, if needed.  Don't wait until your symptoms are severe to call 911. These are reasons to call 911:    Chest pain    Shortness of breath    Feeling lightheaded, faint, or dizzy, or losing consciousness    Very irregular heartbeat    Rapid heartbeat that makes you uncomfortable    Slower than usual heart rate along with symptoms    Chest pain with weakness, dizziness, heavy sweating, nausea, or vomiting    Extreme drowsiness, confusion, or weakness    Weakness of an arm or leg, or on one side of the face    Trouble with speech or vision  When to seek medical advice  Call your healthcare provider right away if you " have palpitations that last longer than normal, or are different from your past palpitations.   Date Last Reviewed: 4/27/2016 2000-2019 [employer]. [address] [facility], [place] [address]. All rights reserved. This information is not intended as a substitute for professional medical care. Always follow your healthcare professional's instructions.           Patient Education     Shortness of Breath (Dyspnea)  Shortness of breath is the feeling that you can't catch your breath or get enough air. It is also known as dyspnea.  Dyspnea can be caused by many different conditions. They include:    Acute asthma attack    Worsening of chronic lung diseases such as chronic bronchitis and emphysema    Heart failure. This is when weak heart muscle allows extra fluid to collect in the lungs.    Panic attacks or anxiety. Fear can cause rapid breathing (hyperventilation).    Pneumonia, or an infection in the lung tissue    Exposure to toxic substances, fumes, smoke, or certain medicines    Blood clot in the lung (pulmonary embolism). This is often from a piece of blood clot in a deep vein of the leg (deep vein thrombosis) that breaks off and travels to the lungs.    Heart attack or heart-related chest pain (angina)    Anemia    Collapsed lung (pneumothorax)    Dehydration    Pregnancy  Based on your visit today, the exact cause of your shortness of breath is not certain. Your tests don t show any of the serious causes of dyspnea. You may need other tests to find out if you have a serious problem. It s important to watch for any new symptoms or symptoms that get worse. Follow up with your healthcare provider as directed.  Home care  Follow these tips to take care of yourself at home:    When your symptoms are better, go back to your usual activities.    If you smoke, you should stop. Join a quit-smoking program or ask your healthcare provider for help.    Eat a healthy diet and get plenty of sleep.    Get  regular exercise. Talk with your healthcare provider before starting to exercise, especially if you have other medical problems.    Cut down on the amount of caffeine and stimulants you consume.  Follow-up care  Follow up with your healthcare provider, or as advised.  If tests were done, you will be told if your treatment needs to be changed. You can call as directed for the results.  If an X-ray was taken, a specialist will review it. You will be notified of any new findings that may affect your care.  Call 911  Shortness of breath may be a sign of a serious medical problem. For example, it may be a problem with your heart or lungs. Call 911 if you have worsening shortness of breath or trouble breathing, especially with any of the symptoms below:    Confusion or difficulty waking    Fainting or loss of consciousness.    Fast or irregular heartbeat    Coughing up blood    Pain in your chest, arm, shoulder, neck, or upper back    Sweating  When to seek medical advice  Call your healthcare provider right away if any of these occur:    Slight shortness of breath or wheezing    Redness, pain or swelling in your leg, arm, or other body area    Swelling in both legs or ankles    Fast weight gain    Dizziness or weakness    Fever of 100.4 F (38 C) or higher, or as directed by your healthcare provider  Date Last Reviewed: 6/1/2018 2000-2019 [employer]. [address] [place] [address]. All rights reserved. This information is not intended as a substitute for professional medical care. Always follow your healthcare professional's instructions.           Patient Education     Understanding Tachycardia    The heart has an electrical system that sends signals to control the heartbeat. Any abnormal change in the speed or pattern of the heartbeat is called an arrhythmia. If you have an arrhythmia that causes the heart to beat faster than normal, this is known as tachycardia. There are many types of  tachycardia. They can affect the heart s upper chambers (atria), the heart s lower chambers (ventricles), or both.  What causes tachycardia?  Many things can cause tachycardia, including:    Damage to heart tissue from heart disease, past heart attack, or heart surgery    Abnormal electrical pathways in the heart    Problems with the heart s structure that you are born with     High blood pressure    Overactive thyroid    Use of certain medicines    Severe blood loss or anemia    Dehydration    Severe stress, fear, or anxiety    Smoking    Too much alcohol or caffeine    Abuse of certain street drugs, such as cocaine    Infections    Certain inflammatory conditions    Chronic  pain syndromes  What are the symptoms of tachycardia?  Tachycardia can cause a fast, pounding, or irregular heartbeat. It can also make it harder for the heart to pump blood efficiently to the body. This may cause symptoms such as:    Shortness of breath    Tiredness    Dizziness or fainting    Chest pain  Some people with tachycardia may have no symptoms at all.  How is tachycardiatreated?  Treatment for tachycardia depends on the cause. It also depends on the type you have and how severe your symptoms are. Tachycardia in the ventricles is often more serious than in the atria. For this reason, it may need to be treated right away. Possible treatments include:    Treating the underlying cause. For instance, if a medicine is causing your tachycardia, changing the dosage or stopping the medicine with your doctor s guidance may correct the problem.    Lifestyle changes. These include getting enough sleep and reducing stress. They also include avoiding caffeine, alcohol, tobacco, and street drugs.    Vagal maneuvers.These are techniques that may help interrupt a fast heartbeat and slow it down. One example is to take a deep breath and bear down hard while holding your breath.     Medicines. These may be used to help slow down a fast heartbeat.  They may also be used to regulate the pattern of the heartbeat.    Electrical cardioversion. Special pads or paddles are used to send one or more brief  electrical shocks to the heart. This can help restore the heartbeat to normal.    Ablation. A long thin tube called a catheter is inserted into a blood vessel and threaded to the heart. The catheter sends out hot or cold energy to the areas causing abnormal signals. This destroys the problem tissue or cells. This may stop certain types of tachycardia.    Pacemaker. This is a device that is placed just under the skin in the chest. It sends paced signals to make the heart beat at a more normal rate and rhythm. You may need this when certain medicines that treat tachycardia also result in a slow heart rate.      Implantable cardioverter defibrillator (ICD). This is a device that is placed just under the skin in the chest or armpit. The ICD monitors your heart rate. When needed, it sends controlled burst of signals to the heart to overdrive a tachycardia.  It can also send a single stronger shock to the heart to stop a life-threatening type of tachycardia, if needed.    Surgery. During surgery, different techniques may be used to create scar tissue in the areas of the heart causing abnormal signals. This may help stop certain types of tachycardia.  What are the complications of tachycardia?  These can include:    Blood clots or stroke    Heart failure. With this problem, the heart muscle is so weak it no longer pumps blood well.    Fainting    Sudden cardiac arrest. This is when the heart suddenly stops beating.  When should I call my healthcare provider?  Call your healthcare provider right away if you have any of these:    Symptoms that don t get better with treatment, or get worse    New symptoms  Date Last Reviewed: 5/1/2016 2000-2019 [employer]. [address] [place] [address], [place] [address]. All rights reserved. This information is not intended as a  substitute for professional medical care. Always follow your healthcare professional's instructions.

## 2020-08-21 NOTE — PROGRESS NOTES
SUBJECTIVE:  Elle Lambert, a 45 year old female scheduled an appointment to discuss the following issues:     Tachycardia  Palpitations  Shortness of breath  Fatigue, unspecified type  COVID-19 virus detected  SHIRA (generalized anxiety disorder)  This is a 46 yo female who was exposed to covid 19 on 7/22.  Developed anosmia and ageusia one week later associated with congestion  Also low grade fever.  Positive for covid 19 on 8/13.   For the past 2 weeks crescendoing in the past 2-3 days, she has noted fluttering heart (?irregularity), anxiety, subjective sob.  She feels as if she is going to have a panic attack and has.  She has chest pressure on the left side as well.  She has a history of anxiety but this feels different.  She has been tired since her symptoms began and that continues.  Lives with  and son and both have been healthy without symptoms.  Her  tested negative once and now is waiting on his second test.    Past Medical History:   Diagnosis Date     ASCUS of cervix with negative high risk HPV 06/17/2017 06/17/17 ASCUS, Neg HPV     Depressive disorder 8/2015    PPD after 11/25/2014 birth - was taking low dose of Zoloft     Gastric polyp - noted on 2019 EGD     2019 endoscopy     Other and unspecified anterior pituitary hyperfunction 11/2001    idiopathic hyperprolactinemia - followed by endocrinologist     Vitamin B 12 deficiency      Social History     Socioeconomic History     Marital status:      Spouse name: Not on file     Number of children: Not on file     Years of education: Not on file     Highest education level: Not on file   Occupational History     Not on file   Social Needs     Financial resource strain: Not on file     Food insecurity     Worry: Not on file     Inability: Not on file     Transportation needs     Medical: Not on file     Non-medical: Not on file   Tobacco Use     Smoking status: Former Smoker     Packs/day: 0.00     Years: 0.00     Pack years: 0.00      Last attempt to quit: 2013     Years since quittin.0     Smokeless tobacco: Never Used   Substance and Sexual Activity     Alcohol use: Yes     Comment: occasional - social     Drug use: Yes     Types: Marijuana     Sexual activity: Yes     Partners: Male     Birth control/protection: Condom   Lifestyle     Physical activity     Days per week: Not on file     Minutes per session: Not on file     Stress: Not on file   Relationships     Social connections     Talks on phone: Not on file     Gets together: Not on file     Attends Worship service: Not on file     Active member of club or organization: Not on file     Attends meetings of clubs or organizations: Not on file     Relationship status: Not on file     Intimate partner violence     Fear of current or ex partner: Not on file     Emotionally abused: Not on file     Physically abused: Not on file     Forced sexual activity: Not on file   Other Topics Concern     Parent/sibling w/ CABG, MI or angioplasty before 65F 55M? No   Social History Narrative     Not on file     Family History   Problem Relation Age of Onset     Lung Cancer Father 48        smoker - metastatic     Alcoholism Father         alcohol     Hypertension Mother         controlled - medication     Coronary Artery Disease Mother         recently diagnosed(64) - stent placed.  genetic     Obesity Mother         Gastric sleeve  2017     Brain Cancer Maternal Grandfather         brain     Asthma Paternal Grandmother      Asthma Paternal Grandfather      Coronary Artery Disease Paternal Grandfather      Heart Failure Paternal Grandfather      Medical, social, surgical, and family histories reviewed.    ROS:  CONSTITUTIONAL: NEGATIVE for fever, chills  EYES: NEGATIVE for vision changes   RESP: POSITIVE for SOB, no cough, pleurisy, hemoptysis  CV: POSITIVE for chest pain, palpitations   GI: NEGATIVE for nausea, abdominal pain, heartburn, or change in bowel habits  : NEGATIVE for  frequency, dysuria, or hematuria  MUSCULOSKELETAL: NEGATIVE for significant arthralgias or myalgia  NEURO: NEGATIVE for weakness, dizziness or paresthesias or headache    OBJECTIVE:  BP (!) 145/92 (BP Location: Right arm, Patient Position: Chair, Cuff Size: Adult Large)   Pulse 82   Temp 99.2  F (37.3  C) (Oral)   Resp 16   Wt 93 kg (205 lb)   LMP 08/03/2020 (Exact Date)   SpO2 96%   BMI 34.11 kg/m    EXAM:  GENERAL APPEARANCE: healthy, alert and no distress  EYES: EOMI,  PERRL  HENT: ear canals and TM's normal and nose and mouth without ulcers or lesions  RESP: lungs clear to auscultation - no rales, rhonchi or wheezes  CV: regular rates and rhythm, normal S1 S2, no S3 or S4 and no murmur, click or rub -  ABDOMEN:  soft, nontender, no HSM or masses and bowel sounds normal    CBC, CMP, D dimer, Troponin, TSH, CRP, hcg, lipase all wnl.  B12 and vit d pending  CTA Chest - no pe, left basilar infiltrate or atelectasis  EKG - rate 84, nsr, no st or t wave changes, no q waves, nl intervals, unchanged from previous per my read    ASSESSMENT/PLAN:  (R00.0) Tachycardia  Plan: sodium chloride (PF) 0.9% PF flush 3 mL, IV         access, CBC with platelets differential,         Comprehensive metabolic panel, CRP         inflammation, Troponin I, TSH with free T4         reflex, Lipase, D dimer, quantitative, EKG         12-lead complete w/read - Clinics, CT Chest         Pulmonary Embolism w Contrast, HCG qualitative  Cardiac monitor through primary care clinic    (R00.2) Palpitations  Plan: sodium chloride (PF) 0.9% PF flush 3 mL, IV         access, CBC with platelets differential,         Comprehensive metabolic panel, CRP         inflammation, Troponin I, TSH with free T4         reflex, Lipase, D dimer, quantitative, EKG         12-lead complete w/read - Clinics, CT Chest         Pulmonary Embolism w Contrast, HCG qualitative    (R06.02) Shortness of breath  Plan: CBC with platelets differential, Comprehensive          metabolic panel, CRP inflammation, Troponin I,         TSH with free T4 reflex, Lipase, D dimer,         quantitative, CT Chest Pulmonary Embolism w         Contrast, HCG qualitative  Treat for pneumonia with doxycycline for 7 days    (R53.83) Fatigue, unspecified type    (U07.1) COVID-19 virus detected    (F41.1) SHIRA (generalized anxiety disorder)    Advise cardiac monitor through pcp  Doxycycline for left lower lobe infiltrate (?pneumonia) on cta chest  Follow up with pcp in 1-2 weeks.

## 2020-08-23 LAB — DEPRECATED CALCIDIOL+CALCIFEROL SERPL-MC: 51 UG/L (ref 20–75)

## 2020-09-03 ENCOUNTER — HOSPITAL ENCOUNTER (OUTPATIENT)
Dept: CARDIOLOGY | Facility: CLINIC | Age: 45
Discharge: HOME | End: 2020-09-03
Attending: NURSE PRACTITIONER | Admitting: NURSE PRACTITIONER
Payer: COMMERCIAL

## 2020-09-03 DIAGNOSIS — R00.0 TACHYCARDIA: ICD-10-CM

## 2020-09-03 DIAGNOSIS — R06.02 SHORTNESS OF BREATH: ICD-10-CM

## 2020-09-03 DIAGNOSIS — R00.2 PALPITATIONS: ICD-10-CM

## 2020-09-03 PROCEDURE — 0296T LEADLESS EKG MONITOR 3 TO 14 DAYS: CPT

## 2020-09-03 PROCEDURE — 0298T LEADLESS EKG MONITOR 3 TO 14 DAYS: CPT | Performed by: INTERNAL MEDICINE

## 2020-09-03 NOTE — LETTER
[facility]  [address] [place] [address]  [phone]                    September 28, 2020    Elle Lambert  [address] [facility] [address]      Dear Elle,    Here is a summary of your recent test results:    Great news. Your Zio patch monitor is normal. You had a one time elevated heart rate called supraventricular tachycardia and it was only for 8 beats. I hope you are feeling better.  Please follow up if symptoms continue.     For additional lab test information, [email or web address] is an excellent reference.     In addition, here is a list of due or overdue Health Maintenance reminders:     Preventive Care Visit due on 01/29/2020   HPV Screening due on 06/16/2020   PAP Smear due on 06/16/2020   Flu Vaccine(1) due on 09/01/2020     Please call us at [phone] (or use [employer]) to address the above recommendations if needed.     Your test results are enclosed.      Please contact me if you have any questions.    In addition, here is a list of due or overdue Health Maintenance reminders.    Health Maintenance Due   Topic Date Due     Preventive Care Visit  01/29/2020     HPV Screening  06/16/2020     PAP Smear  06/16/2020     Flu Vaccine (1) 09/01/2020       Please call us at [phone] (or use [employer]) to address the above recommendations.            Thank you very much for trusting [facility]..     Healthy regards,      Kimi Santana, FNP-BC       No results found for any visits on 09/03/20.

## 2020-09-27 NOTE — RESULT ENCOUNTER NOTE
Please send letter.     Dear Elle,     Here is a summary of your recent test results:    Great news. Your Zio patch monitor is normal. You had a one time elevated heart rate called supraventricular tachycardia and it was only for 8 beats. I hope you are feeling better.  Please follow up if symptoms continue.     For additional lab test information, [email or web address] is an excellent reference.    In addition, here is a list of due or overdue Health Maintenance reminders:    Preventive Care Visit due on 01/29/2020  HPV Screening due on 06/16/2020  PAP Smear due on 06/16/2020  Flu Vaccine(1) due on 09/01/2020    Please call us at [phone] (or use [employer]) to address the above recommendations if needed.    Thank you for choosing  [facility].  It was an honor and a privilege to participate in your care.       Healthy regards,    Kimi Santana, KHALIDA  [facility]

## 2020-11-30 DIAGNOSIS — F41.1 GAD (GENERALIZED ANXIETY DISORDER): ICD-10-CM

## 2020-11-30 DIAGNOSIS — F33.0 MILD RECURRENT MAJOR DEPRESSION (H): ICD-10-CM

## 2020-11-30 RX ORDER — CITALOPRAM HYDROBROMIDE 20 MG/1
20 TABLET ORAL DAILY
Qty: 90 TABLET | Refills: 0 | Status: SHIPPED | OUTPATIENT
Start: 2020-11-30 | End: 2021-03-15

## 2020-11-30 NOTE — TELEPHONE ENCOUNTER
90 days sent to pharmacy.  Please advise Elle she is due for her annual visit with fasting labs/pap/med check.  Please assist with scheduling.       Brynn Trinidad MBA, MS, PA-C

## 2020-11-30 NOTE — TELEPHONE ENCOUNTER
Routing refill request to provider for review/approval because:  PHQ 9 is not less than 5.  PHQ 3/23/2020 6/8/2020 8/20/2020   PHQ-9 Total Score 14 3 10   Q9: Thoughts of better off dead/self-harm past 2 weeks Not at all Not at all Not at all     Drea Wynn RN

## 2021-01-05 DIAGNOSIS — F41.1 GAD (GENERALIZED ANXIETY DISORDER): ICD-10-CM

## 2021-01-05 DIAGNOSIS — F33.0 MILD RECURRENT MAJOR DEPRESSION (H): ICD-10-CM

## 2021-01-06 RX ORDER — BUPROPION HYDROCHLORIDE 150 MG/1
TABLET, EXTENDED RELEASE ORAL
Qty: 180 TABLET | Refills: 0 | Status: SHIPPED | OUTPATIENT
Start: 2021-01-06 | End: 2021-04-14

## 2021-01-06 NOTE — TELEPHONE ENCOUNTER
Routing refill request to provider for review/approval because:  Labs out of range:  phq9    SUZANNE HuntN, RN  [facility]

## 2021-01-07 ENCOUNTER — MYC MEDICAL ADVICE (OUTPATIENT)
Dept: FAMILY MEDICINE | Facility: CLINIC | Age: 46
End: 2021-01-07

## 2021-01-07 NOTE — TELEPHONE ENCOUNTER
3 month supply send to pharmacy.  Please advise pt she is due for a med check for further fills.  Video visit OK if preferred.      Brynn Trinidad MBA, MS, PA-C

## 2021-01-07 NOTE — TELEPHONE ENCOUNTER
Per YENNY Vega, patient does not need appointment until 8/2021 as long as PHQ9 /GAD7 are not failing.  Patient will fill out PHQ9/GAD7 through [employer] and send back.    Ca Rascon CMA

## 2021-01-10 ENCOUNTER — HEALTH MAINTENANCE LETTER (OUTPATIENT)
Age: 46
End: 2021-01-10

## 2021-01-17 ASSESSMENT — ANXIETY QUESTIONNAIRES
GAD7 TOTAL SCORE: 13
7. FEELING AFRAID AS IF SOMETHING AWFUL MIGHT HAPPEN: MORE THAN HALF THE DAYS
6. BECOMING EASILY ANNOYED OR IRRITABLE: NEARLY EVERY DAY
5. BEING SO RESTLESS THAT IT IS HARD TO SIT STILL: SEVERAL DAYS
GAD7 TOTAL SCORE: 13
GAD7 TOTAL SCORE: 13
1. FEELING NERVOUS, ANXIOUS, OR ON EDGE: NEARLY EVERY DAY
4. TROUBLE RELAXING: SEVERAL DAYS
7. FEELING AFRAID AS IF SOMETHING AWFUL MIGHT HAPPEN: MORE THAN HALF THE DAYS
3. WORRYING TOO MUCH ABOUT DIFFERENT THINGS: MORE THAN HALF THE DAYS
2. NOT BEING ABLE TO STOP OR CONTROL WORRYING: SEVERAL DAYS

## 2021-01-17 ASSESSMENT — PATIENT HEALTH QUESTIONNAIRE - PHQ9
10. IF YOU CHECKED OFF ANY PROBLEMS, HOW DIFFICULT HAVE THESE PROBLEMS MADE IT FOR YOU TO DO YOUR WORK, TAKE CARE OF THINGS AT HOME, OR GET ALONG WITH OTHER PEOPLE: SOMEWHAT DIFFICULT
SUM OF ALL RESPONSES TO PHQ QUESTIONS 1-9: 5
SUM OF ALL RESPONSES TO PHQ QUESTIONS 1-9: 5

## 2021-01-18 ASSESSMENT — PATIENT HEALTH QUESTIONNAIRE - PHQ9: SUM OF ALL RESPONSES TO PHQ QUESTIONS 1-9: 5

## 2021-01-18 ASSESSMENT — ANXIETY QUESTIONNAIRES: GAD7 TOTAL SCORE: 13

## 2021-01-19 NOTE — TELEPHONE ENCOUNTER
PHQ 6/8/2020 8/20/2020 1/17/2021   PHQ-9 Total Score 3 10 5   Q9: Thoughts of better off dead/self-harm past 2 weeks Not at all Not at all Not at all     SHIRA-7 SCORE 3/23/2020 6/8/2020 1/17/2021   Total Score 18 (severe anxiety) - 13 (moderate anxiety)   Total Score 18 8 13       Jess Bravo CMA

## 2021-03-06 DIAGNOSIS — F41.1 GAD (GENERALIZED ANXIETY DISORDER): ICD-10-CM

## 2021-03-06 DIAGNOSIS — F33.0 MILD RECURRENT MAJOR DEPRESSION (H): ICD-10-CM

## 2021-03-08 NOTE — TELEPHONE ENCOUNTER
LOV: 8/21/2020  Patient due for med check  No future appt scheduled    Routing to [facility] to assist in scheduling      Ekaterina Mejia RN  [facility]

## 2021-03-15 ENCOUNTER — MYC MEDICAL ADVICE (OUTPATIENT)
Dept: FAMILY MEDICINE | Facility: CLINIC | Age: 46
End: 2021-03-15

## 2021-03-15 RX ORDER — CITALOPRAM HYDROBROMIDE 20 MG/1
TABLET ORAL
Qty: 90 TABLET | Refills: 1 | Status: SHIPPED | OUTPATIENT
Start: 2021-03-15 | End: 2021-06-29

## 2021-03-15 NOTE — TELEPHONE ENCOUNTER
PHQ 6/8/2020 8/20/2020 1/17/2021   PHQ-9 Total Score 3 10 5   Q9: Thoughts of better off dead/self-harm past 2 weeks Not at all Not at all Not at all

## 2021-03-15 NOTE — TELEPHONE ENCOUNTER
Received a call from Pharmacy regarding status of refill. Explained Provider and care team have been in contact with patient and working through getting med refill resolved.    Toyin PANDA

## 2021-03-15 NOTE — TELEPHONE ENCOUNTER
Patient states that Brynn Trinidad told her if her assessments came back ok, she wouldn't have to be seen right now, until August, because of no insurance.  Please advise.

## 2021-03-22 ENCOUNTER — MYC MEDICAL ADVICE (OUTPATIENT)
Dept: FAMILY MEDICINE | Facility: CLINIC | Age: 46
End: 2021-03-22

## 2021-03-22 ASSESSMENT — ANXIETY QUESTIONNAIRES
GAD7 TOTAL SCORE: 8
7. FEELING AFRAID AS IF SOMETHING AWFUL MIGHT HAPPEN: SEVERAL DAYS
GAD7 TOTAL SCORE: 8
GAD7 TOTAL SCORE: 8
1. FEELING NERVOUS, ANXIOUS, OR ON EDGE: SEVERAL DAYS
4. TROUBLE RELAXING: SEVERAL DAYS
3. WORRYING TOO MUCH ABOUT DIFFERENT THINGS: MORE THAN HALF THE DAYS
7. FEELING AFRAID AS IF SOMETHING AWFUL MIGHT HAPPEN: SEVERAL DAYS
6. BECOMING EASILY ANNOYED OR IRRITABLE: SEVERAL DAYS
2. NOT BEING ABLE TO STOP OR CONTROL WORRYING: SEVERAL DAYS
5. BEING SO RESTLESS THAT IT IS HARD TO SIT STILL: SEVERAL DAYS

## 2021-03-22 ASSESSMENT — PATIENT HEALTH QUESTIONNAIRE - PHQ9
SUM OF ALL RESPONSES TO PHQ QUESTIONS 1-9: 3
SUM OF ALL RESPONSES TO PHQ QUESTIONS 1-9: 3
10. IF YOU CHECKED OFF ANY PROBLEMS, HOW DIFFICULT HAVE THESE PROBLEMS MADE IT FOR YOU TO DO YOUR WORK, TAKE CARE OF THINGS AT HOME, OR GET ALONG WITH OTHER PEOPLE: SOMEWHAT DIFFICULT

## 2021-03-22 NOTE — TELEPHONE ENCOUNTER
My chart message sent     Chart updated     Penelope Urena RN, BSN  [facility]

## 2021-03-23 ASSESSMENT — PATIENT HEALTH QUESTIONNAIRE - PHQ9: SUM OF ALL RESPONSES TO PHQ QUESTIONS 1-9: 3

## 2021-03-23 ASSESSMENT — ANXIETY QUESTIONNAIRES: GAD7 TOTAL SCORE: 8

## 2021-04-14 ENCOUNTER — MYC MEDICAL ADVICE (OUTPATIENT)
Dept: FAMILY MEDICINE | Facility: CLINIC | Age: 46
End: 2021-04-14

## 2021-04-14 DIAGNOSIS — F41.1 GAD (GENERALIZED ANXIETY DISORDER): ICD-10-CM

## 2021-04-14 DIAGNOSIS — F33.0 MILD RECURRENT MAJOR DEPRESSION (H): ICD-10-CM

## 2021-04-14 NOTE — TELEPHONE ENCOUNTER
Please see my chart message below     Please review and advise     Thank you     Penelope Urena RN, BSN  [facility]

## 2021-04-15 RX ORDER — BUPROPION HYDROCHLORIDE 150 MG/1
TABLET, EXTENDED RELEASE ORAL
Qty: 90 TABLET | Refills: 1 | Status: SHIPPED | OUTPATIENT
Start: 2021-04-15 | End: 2021-06-11

## 2021-05-08 ENCOUNTER — HEALTH MAINTENANCE LETTER (OUTPATIENT)
Age: 46
End: 2021-05-08

## 2021-06-28 NOTE — PROGRESS NOTES
Elle is a 46 year old who is being evaluated via a billable video visit.      How would you like to obtain your AVS? MyChart  If the video visit is dropped, the invitation should be resent by: Send to e-mail at: [email or web address]  Will anyone else be joining your video visit? No  {If patient encounters technical issues they should call [phone] :[id number]}    Video Start Time: 3:46 PM    Assessment & Plan     Mild recurrent major depression (H)  ***  - buPROPion (WELLBUTRIN SR) 150 MG 12 hr tablet  Dispense: 90 tablet; Refill: 0  - citalopram (CELEXA) 20 MG tablet  Dispense: 90 tablet; Refill: 1    SHIRA (generalized anxiety disorder)  ***  - buPROPion (WELLBUTRIN SR) 150 MG 12 hr tablet  Dispense: 90 tablet; Refill: 0  - citalopram (CELEXA) 20 MG tablet  Dispense: 90 tablet; Refill: 1        No follow-ups on file.    Brynn Trinidad PA-C  [facility]   Elle is a 46 year old who presents for the following health issues     HPI       Social History     Tobacco Use     Smoking status: Former Smoker     Packs/day: 0.00     Years: 0.00     Pack years: 0.00     Quit date: 2013     Years since quittin.8     Smokeless tobacco: Never Used   Substance Use Topics     Alcohol use: Yes     Comment: occasional - social     Drug use: Yes     Types: Marijuana     PHQ 2021 3/22/2021 2021   PHQ-9 Total Score 5 3 4   Q9: Thoughts of better off dead/self-harm past 2 weeks Not at all Not at all Not at all     SHIRA-7 SCORE 2021 3/22/2021 2021   Total Score 13 (moderate anxiety) 8 (mild anxiety) -   Total Score 13 8 6     Last PHQ-9 2021   1.  Little interest or pleasure in doing things 0   2.  Feeling down, depressed, or hopeless 1   3.  Trouble falling or staying asleep, or sleeping too much 1   4.  Feeling tired or having little energy 1   5.  Poor appetite or overeating 0   6.  Feeling bad about yourself 0   7.  Trouble concentrating 1   8.  Moving  slowly or restless 0   Q9: Thoughts of better off dead/self-harm past 2 weeks 0   PHQ-9 Total Score 4   Difficulty at work, home, or with people Somewhat difficult     SHIRA-7  6/29/2021   1. Feeling nervous, anxious, or on edge 1   2. Not being able to stop or control worrying 1   3. Worrying too much about different things 1   4. Trouble relaxing 1   5. Being so restless that it is hard to sit still 1   6. Becoming easily annoyed or irritable 1   7. Feeling afraid, as if something awful might happen 0   SHIRA-7 Total Score 6   If you checked any problems, how difficult have they made it for you to do your work, take care of things at home, or get along with other people? Somewhat difficult       Suicide Assessment Five-step Evaluation and Treatment (SAFE-T)  {Provider  Link to Depression Care Package SmartSet :274555}    How many servings of fruits and vegetables do you eat daily?  2-3    On average, how many sweetened beverages do you drink each day (Examples: soda, juice, sweet tea, etc.  Do NOT count diet or artificially sweetened beverages)?   0    How many days per week do you exercise enough to make your heart beat faster? 4    How many minutes a day do you exercise enough to make your heart beat faster? 20 - 29    How many days per week do you miss taking your medication? 0        Review of Systems   Constitutional, HEENT, cardiovascular, pulmonary, GI, , musculoskeletal, neuro, skin, endocrine and psych systems are negative, except as otherwise noted.      Objective           Vitals:  No vitals were obtained today due to virtual visit.    Physical Exam   GENERAL: Healthy, alert and no distress  EYES: Eyes grossly normal to inspection.  No discharge or erythema, or obvious scleral/conjunctival abnormalities.  RESP: No audible wheeze, cough, or visible cyanosis.  No visible retractions or increased work of breathing.    SKIN: Visible skin clear. No significant rash, abnormal pigmentation or lesions.  NEURO:  "Cranial nerves grossly intact.  Mentation and speech appropriate for age.  PSYCH: Mentation appears normal, affect normal/bright, judgement and insight intact, normal speech and appearance well-groomed.                Video-Visit Details    Type of service:  Video Visit    Video End Time:{video visit start/end time for provider to select:566522}    Originating Location (pt. Location): {video visit patient location:752545::\"Home\"}    Distant Location (provider location):  [facility]     Platform used for Video Visit: {Virtual Visit Platforms:669244::\"[employer]\"}  "

## 2021-06-29 ENCOUNTER — VIRTUAL VISIT (OUTPATIENT)
Dept: FAMILY MEDICINE | Facility: CLINIC | Age: 46
End: 2021-06-29

## 2021-06-29 DIAGNOSIS — F33.0 MILD RECURRENT MAJOR DEPRESSION (H): Primary | ICD-10-CM

## 2021-06-29 DIAGNOSIS — F41.1 GAD (GENERALIZED ANXIETY DISORDER): ICD-10-CM

## 2021-06-29 PROCEDURE — 96127 BRIEF EMOTIONAL/BEHAV ASSMT: CPT | Mod: 95 | Performed by: PHYSICIAN ASSISTANT

## 2021-06-29 PROCEDURE — 99214 OFFICE O/P EST MOD 30 MIN: CPT | Mod: 95 | Performed by: PHYSICIAN ASSISTANT

## 2021-06-29 RX ORDER — CITALOPRAM HYDROBROMIDE 20 MG/1
20 TABLET ORAL DAILY
Qty: 90 TABLET | Refills: 1 | Status: SHIPPED | OUTPATIENT
Start: 2021-06-29 | End: 2022-03-31

## 2021-06-29 RX ORDER — BUPROPION HYDROCHLORIDE 150 MG/1
150 TABLET, EXTENDED RELEASE ORAL 2 TIMES DAILY
Qty: 90 TABLET | Refills: 1 | Status: SHIPPED | OUTPATIENT
Start: 2021-06-29 | End: 2022-03-31

## 2021-06-29 RX ORDER — LORAZEPAM 0.5 MG/1
0.5 TABLET ORAL DAILY PRN
Qty: 20 TABLET | Refills: 0 | Status: SHIPPED | OUTPATIENT
Start: 2021-06-29

## 2021-06-29 ASSESSMENT — PATIENT HEALTH QUESTIONNAIRE - PHQ9
SUM OF ALL RESPONSES TO PHQ QUESTIONS 1-9: 4
5. POOR APPETITE OR OVEREATING: SEVERAL DAYS

## 2021-06-29 ASSESSMENT — ANXIETY QUESTIONNAIRES
IF YOU CHECKED OFF ANY PROBLEMS ON THIS QUESTIONNAIRE, HOW DIFFICULT HAVE THESE PROBLEMS MADE IT FOR YOU TO DO YOUR WORK, TAKE CARE OF THINGS AT HOME, OR GET ALONG WITH OTHER PEOPLE: SOMEWHAT DIFFICULT
GAD7 TOTAL SCORE: 6
2. NOT BEING ABLE TO STOP OR CONTROL WORRYING: SEVERAL DAYS
3. WORRYING TOO MUCH ABOUT DIFFERENT THINGS: SEVERAL DAYS
5. BEING SO RESTLESS THAT IT IS HARD TO SIT STILL: SEVERAL DAYS
1. FEELING NERVOUS, ANXIOUS, OR ON EDGE: SEVERAL DAYS
7. FEELING AFRAID AS IF SOMETHING AWFUL MIGHT HAPPEN: NOT AT ALL
6. BECOMING EASILY ANNOYED OR IRRITABLE: SEVERAL DAYS

## 2021-06-29 NOTE — PROGRESS NOTES
Elle is a 46 year old who is being evaluated via a billable telephone visit.      What phone number would you like to be contacted at? [phone]  How would you like to obtain your AVS? MyChart    Assessment & Plan     Mild recurrent major depression (H)  SHIRA (generalized anxiety disorder)  Overall doing quite well.  We will continue current medication regimen unchanged.  Continue self-care and will establish with a new provider in [place] when she gets settled.  Wished her well in her new endeavors and will help to continue her medication regimen for the next year.    - buPROPion (WELLBUTRIN SR) 150 MG 12 hr tablet  Dispense: 90 tablet; Refill: 1  - citalopram (CELEXA) 20 MG tablet  Dispense: 90 tablet; Refill: 1  - LORazepam (ATIVAN) 0.5 MG tablet  Dispense: 20 tablet; Refill: 0    Return in about 6 months (around 2021) for repeat PHQ9/SHIRA in 6 months if doing well and will fill x 6 months..    Brynn Trinidad PA-C  [facility]   Elle is a 46 year old who presents for the following health issues     HPI     Depression and Anxiety Follow-Up  Citalopram 20 mg and bupropion 150 mg.  Moving out of [facility] - to [place], [place].    How are you doing with your depression since your last visit? Improved - moving -very excited for this.    How are you doing with your anxiety since your last visit?  No change    Are you having other symptoms that might be associated with depression or anxiety? No    Have you had a significant life event? Job Concerns     Do you have any concerns with your use of alcohol or other drugs? No      Social History     Tobacco Use     Smoking status: Former Smoker     Packs/day: 0.00     Years: 0.00     Pack years: 0.00     Quit date: 2013     Years since quittin.8     Smokeless tobacco: Never Used   Substance Use Topics     Alcohol use: Yes     Comment: occasional - social     Drug use: Yes     Types: Marijuana     PHQ 2021  3/22/2021 6/29/2021   PHQ-9 Total Score 5 3 4   Q9: Thoughts of better off dead/self-harm past 2 weeks Not at all Not at all Not at all     SHIRA-7 SCORE 1/17/2021 3/22/2021 6/29/2021   Total Score 13 (moderate anxiety) 8 (mild anxiety) -   Total Score 13 8 6     Last PHQ-9 6/29/2021   1.  Little interest or pleasure in doing things 0   2.  Feeling down, depressed, or hopeless 1   3.  Trouble falling or staying asleep, or sleeping too much 1   4.  Feeling tired or having little energy 1   5.  Poor appetite or overeating 0   6.  Feeling bad about yourself 0   7.  Trouble concentrating 1   8.  Moving slowly or restless 0   Q9: Thoughts of better off dead/self-harm past 2 weeks 0   PHQ-9 Total Score 4   Difficulty at work, home, or with people Somewhat difficult     SHIRA-7  6/29/2021   1. Feeling nervous, anxious, or on edge 1   2. Not being able to stop or control worrying 1   3. Worrying too much about different things 1   4. Trouble relaxing 1   5. Being so restless that it is hard to sit still 1   6. Becoming easily annoyed or irritable 1   7. Feeling afraid, as if something awful might happen 0   SHIRA-7 Total Score 6   If you checked any problems, how difficult have they made it for you to do your work, take care of things at home, or get along with other people? Somewhat difficult       Review of Systems   Constitutional, HEENT, cardiovascular, pulmonary, GI, , musculoskeletal, neuro, skin, endocrine and psych systems are negative, except as otherwise noted.      Objective           Vitals:  No vitals were obtained today due to virtual visit.    Physical Exam   healthy, alert and no distress  PSYCH: Alert and oriented times 3; coherent speech, normal   rate and volume, able to articulate logical thoughts, able   to abstract reason, no tangential thoughts, no hallucinations   or delusions  Her affect is normal  RESP: No cough, no audible wheezing, able to talk in full sentences  Remainder of exam unable to be  completed due to telephone visits          Phone call duration: 19:30 minutes

## 2021-06-30 ASSESSMENT — ANXIETY QUESTIONNAIRES: GAD7 TOTAL SCORE: 6

## 2021-10-23 ENCOUNTER — HEALTH MAINTENANCE LETTER (OUTPATIENT)
Age: 46
End: 2021-10-23

## 2022-02-17 PROBLEM — K21.9 GASTROESOPHAGEAL REFLUX DISEASE: Status: ACTIVE | Noted: 2019-11-21

## 2022-03-20 ENCOUNTER — MYC MEDICAL ADVICE (OUTPATIENT)
Dept: FAMILY MEDICINE | Facility: CLINIC | Age: 47
End: 2022-03-20
Payer: COMMERCIAL

## 2022-03-20 DIAGNOSIS — F41.1 GAD (GENERALIZED ANXIETY DISORDER): ICD-10-CM

## 2022-03-20 DIAGNOSIS — F33.0 MILD RECURRENT MAJOR DEPRESSION (H): ICD-10-CM

## 2022-03-23 NOTE — TELEPHONE ENCOUNTER
Please review and advise Kathleent request    Last office visit 6/29/2021  PHQ 3/22/2021 6/29/2021 3/20/2022   PHQ-9 Total Score 3 4 5   Q9: Thoughts of better off dead/self-harm past 2 weeks Not at all Not at all Not at all     SHIRA-7 SCORE 3/22/2021 6/29/2021 3/20/2022   Total Score 8 (mild anxiety) - 13 (moderate anxiety)   Total Score 8 6 13      Disp Refills Start End PAULINE   citalopram (CELEXA) 20 MG tablet 90 tablet 1 6/29/2021  No   Sig - Route: Take 1 tablet (20 mg) by mouth daily - Oral   Sent to pharmacy as: Citalopram Hydrobromide 20 MG Oral Tablet (celeXA)   Class: E-Prescribe   Order: [id number]   E-Prescribing Status: Receipt confirmed by pharmacy (6/29/2021  4:10 PM CDT)        Disp Refills Start End PAULINE   LORazepam (ATIVAN) 0.5 MG tablet 20 tablet 0 6/29/2021  No   Sig - Route: Take 1 tablet (0.5 mg) by mouth daily as needed for anxiety Not intended for daily use - Oral   Sent to pharmacy as: LORazepam 0.5 MG Oral Tablet (ATIVAN)   Class: E-Prescribe   Order: [id number]   E-Prescribing Status: Receipt confirmed by pharmacy (6/29/2021  4:11 PM CDT)       Vicky PARKER RN   [facility] Triage

## 2022-03-25 NOTE — TELEPHONE ENCOUNTER
Brynn is gone. Covering.     Please call patient; she has not been seen since 6/2021. At that time she was moving to [place] and was filled for 6 months of meds. She would be out by now as that was 9 months ago?     She is overdue for follow up. She would have been due in December and we unfortunately can not continue [place] of [place] as we can not even do a virtual.     Please inquire about meds and being out for the last 3 months or did she get some filled there?  We could send one more 90 day supply of Celexa &  Wellbutrin until she finds a PCP but that would be the last fill. We would not be able to fill the controlled substance ativan.     Please route back to me.       Kimi Santana, KHALIDA-BC

## 2022-03-25 NOTE — TELEPHONE ENCOUNTER
Attempt # 1    Called # [phone]     Left a non detailed VM to call back at [phone] and ask for any available Triage Nurse.    Alicia Harrison RN  [facility]

## 2022-03-29 NOTE — TELEPHONE ENCOUNTER
Attempt # 2    Called # [phone]     Left a non detailed VM to call back at [phone] and ask for any available Triage Nurse.    Alicia Harrison RN  [facility]

## 2022-03-31 NOTE — TELEPHONE ENCOUNTER
Pt called back, noted she is having a hard time with getting a PCP out there. Pt noted not many people are taking state aid insurance. Pt noted she should be able to be seen sometime in May, nothing currently scheduled. Pt stated understanding of last fill and no controlled substance. Pt was appreciative and will continue to work hard at finding a PCP.    Routing back to Kimi ANGEL to review and advise further if needed.    Colin PARKER RN   [facility] Triage

## 2022-04-01 RX ORDER — CITALOPRAM HYDROBROMIDE 20 MG/1
20 TABLET ORAL DAILY
Qty: 90 TABLET | Refills: 0 | Status: SHIPPED | OUTPATIENT
Start: 2022-04-01

## 2022-04-01 RX ORDER — BUPROPION HYDROCHLORIDE 150 MG/1
150 TABLET, EXTENDED RELEASE ORAL 2 TIMES DAILY
Qty: 180 TABLET | Refills: 0 | Status: SHIPPED | OUTPATIENT
Start: 2022-04-01

## 2022-04-01 NOTE — TELEPHONE ENCOUNTER
3 months sent no further refills needs new PCP in the state [facility].  She has been given kisha refills.           KHALIDA Urbina-BC

## 2022-06-04 ENCOUNTER — HEALTH MAINTENANCE LETTER (OUTPATIENT)
Age: 47
End: 2022-06-04

## 2022-10-09 ENCOUNTER — HEALTH MAINTENANCE LETTER (OUTPATIENT)
Age: 47
End: 2022-10-09

## 2022-11-26 ENCOUNTER — HEALTH MAINTENANCE LETTER (OUTPATIENT)
Age: 47
End: 2022-11-26

## 2023-06-10 ENCOUNTER — HEALTH MAINTENANCE LETTER (OUTPATIENT)
Age: 48
End: 2023-06-10

## (undated) DEVICE — KIT ENDO TURNOVER/PROCEDURE W/CLEAN A SCOPE LINERS 103888

## (undated) DEVICE — ENDO FORCEP BX CAPTURA JUMBO SPIKE 2.8MMX160CM G56064

## (undated) DEVICE — ENDO BITE BLOCK ADULT OLYMPUS LATEX FREE MAJ-1632

## (undated) RX ORDER — FENTANYL CITRATE 50 UG/ML
INJECTION, SOLUTION INTRAMUSCULAR; INTRAVENOUS
Status: DISPENSED
Start: 2019-11-14